# Patient Record
Sex: MALE | Race: AMERICAN INDIAN OR ALASKA NATIVE | NOT HISPANIC OR LATINO | Employment: OTHER | URBAN - METROPOLITAN AREA
[De-identification: names, ages, dates, MRNs, and addresses within clinical notes are randomized per-mention and may not be internally consistent; named-entity substitution may affect disease eponyms.]

---

## 2017-03-14 PROBLEM — G25.0 ESSENTIAL TREMOR: Status: ACTIVE | Noted: 2017-03-14

## 2017-09-14 PROBLEM — G89.29 CHRONIC PAIN OF RIGHT KNEE: Status: ACTIVE | Noted: 2017-09-14

## 2017-09-14 PROBLEM — M25.561 CHRONIC PAIN OF RIGHT KNEE: Status: ACTIVE | Noted: 2017-09-14

## 2017-09-27 PROBLEM — R05.9 COUGH: Status: ACTIVE | Noted: 2017-09-27

## 2017-10-02 PROBLEM — L03.115 CELLULITIS OF RIGHT KNEE: Status: ACTIVE | Noted: 2017-10-02

## 2017-10-04 PROBLEM — J96.20 ACUTE ON CHRONIC RESPIRATORY FAILURE: Status: ACTIVE | Noted: 2017-10-04

## 2017-12-18 ENCOUNTER — HOSPITAL ENCOUNTER (OUTPATIENT)
Dept: RADIOLOGY | Facility: OTHER | Age: 67
Discharge: HOME | End: 2017-12-18
Attending: ANESTHESIOLOGY
Payer: MEDICARE

## 2017-12-18 ENCOUNTER — TELEPHONE (OUTPATIENT)
Dept: PAIN MEDICINE | Facility: CLINIC | Age: 67
End: 2017-12-18

## 2017-12-18 ENCOUNTER — OFFICE VISIT (OUTPATIENT)
Dept: PAIN MEDICINE | Facility: CLINIC | Age: 67
End: 2017-12-18
Attending: ANESTHESIOLOGY
Payer: MEDICARE

## 2017-12-18 VITALS
RESPIRATION RATE: 18 BRPM | SYSTOLIC BLOOD PRESSURE: 135 MMHG | OXYGEN SATURATION: 99 % | HEART RATE: 82 BPM | HEIGHT: 72 IN | TEMPERATURE: 98 F | DIASTOLIC BLOOD PRESSURE: 76 MMHG

## 2017-12-18 DIAGNOSIS — M79.2 NEUROPATHIC PAIN, LEG, RIGHT: ICD-10-CM

## 2017-12-18 DIAGNOSIS — G89.29 CHRONIC PAIN OF RIGHT KNEE: Primary | ICD-10-CM

## 2017-12-18 DIAGNOSIS — G89.4 CHRONIC PAIN SYNDROME: Primary | ICD-10-CM

## 2017-12-18 DIAGNOSIS — M54.12 CERVICAL RADICULOPATHY: ICD-10-CM

## 2017-12-18 DIAGNOSIS — G89.4 CHRONIC PAIN SYNDROME: ICD-10-CM

## 2017-12-18 DIAGNOSIS — M79.2 NEUROPATHIC PAIN OF ANKLE, RIGHT: ICD-10-CM

## 2017-12-18 DIAGNOSIS — M25.561 CHRONIC PAIN OF RIGHT KNEE: ICD-10-CM

## 2017-12-18 DIAGNOSIS — M25.561 CHRONIC PAIN OF RIGHT KNEE: Primary | ICD-10-CM

## 2017-12-18 DIAGNOSIS — G89.29 CHRONIC PAIN OF RIGHT KNEE: ICD-10-CM

## 2017-12-18 PROCEDURE — 72114 X-RAY EXAM L-S SPINE BENDING: CPT | Mod: TC

## 2017-12-18 PROCEDURE — 99204 OFFICE O/P NEW MOD 45 MIN: CPT | Mod: S$GLB,,, | Performed by: ANESTHESIOLOGY

## 2017-12-18 PROCEDURE — 72070 X-RAY EXAM THORAC SPINE 2VWS: CPT | Mod: TC

## 2017-12-18 PROCEDURE — 72070 X-RAY EXAM THORAC SPINE 2VWS: CPT | Mod: 26,,, | Performed by: RADIOLOGY

## 2017-12-18 PROCEDURE — 72040 X-RAY EXAM NECK SPINE 2-3 VW: CPT | Mod: 26,,, | Performed by: RADIOLOGY

## 2017-12-18 PROCEDURE — 99999 PR PBB SHADOW E&M-EST. PATIENT-LVL III: CPT | Mod: PBBFAC,,, | Performed by: ANESTHESIOLOGY

## 2017-12-18 PROCEDURE — 72114 X-RAY EXAM L-S SPINE BENDING: CPT | Mod: 26,,, | Performed by: RADIOLOGY

## 2017-12-18 PROCEDURE — 72040 X-RAY EXAM NECK SPINE 2-3 VW: CPT | Mod: TC

## 2017-12-18 RX ORDER — CLONIDINE HYDROCHLORIDE 0.3 MG/1
TABLET ORAL
Status: ON HOLD | COMMUNITY
Start: 2017-12-14 | End: 2017-12-31 | Stop reason: HOSPADM

## 2017-12-18 RX ORDER — FUROSEMIDE 40 MG/1
TABLET ORAL
COMMUNITY
Start: 2017-12-14 | End: 2018-01-12 | Stop reason: SDUPTHER

## 2017-12-18 NOTE — PROGRESS NOTES
Chronic Pain - New Consult    Referring Physician: Earl Marrero MD    Chief Complaint: No chief complaint on file.       SUBJECTIVE: Disclaimer: This note has been generated using voice-recognition software. There may be typographical errors that have been missed during proof-reading    Initial encounter:    Constance Dhillon presents to the clinic for the evaluation of Bilateral Shoulder, Bilateral Knee, Bilateral feet and Lower back pain. The pain started over 18 years ago following an work accident and symptoms have been unchanged. States the pain has progressively gotten over time. States he has had pain for many years and has had multiple medications, injections, PT, topical creams, and acupuncture with minimal relief. He is followed by Dr. Earl Marrero who referred him here today to be evaluated for permanent spinal cord stimulator implantation. He most recently had a trial for the lower back and leg pain 3 weeks ago with [employer] and got 50+% reduction in his pain with much improved function. He also had a C-spine SCS trial on 9/18/17 and had 80+% reduction in his pain. He is here today hoping to move forward with the SCS permanent implants. He takes a baby aspirin daily. Denies bowel or bladder incontinence. His biggest pain today is his right foot. He had excellent coverage during the trial and was able to walk up the stairs for the first time in years.     Brief history:    Pain Description:    The pain is located in the Lower back area and radiates to the Bilateral Lower extremity.      At BEST  4/10     At WORST  10/10 on the WORST day.      On average pain is rated as 6/10.     Today the pain is rated as 7/10    The pain is described as aching, numbing, sharp, shooting and tingling      Symptoms interfere with daily activity and sleeping.     Exacerbating factors: Standing, Walking, Lifting and Getting out of bed/chair.      Mitigating factors medications and SCS trial.     Patient denies  night fever/night sweats, urinary incontinence, bowel incontinence, significant weight loss, significant motor weakness.  Patient denies any suicidal or homicidal ideations    Pain Medications:  Current:  Celebrex  Fentanyl 25 mcg       Tried in Past:  NSAIDs -Never  TCA -Never  SNRI -Never  Anti-convulsants -Never  Muscle Relaxants -all of them   Opioids-Never    Physical Therapy/Home Exercise: yes       report:      Pain Procedures: multiple injections in the past with minimal relief. The most he got with an injection was 3 weeks.     Chiropractor -yes  Acupuncture - yes  TENS unit -never  Spinal decompression -never  Joint replacement -bilateral TKA, right ankle arthrodesis    Imaging:   X-ray of Knee  Right knee.    Total knee replacement    Total right knee arthroplasty with drain in place    Impression total right knee arthroplasty      Electronically signed by: ERIBERTO JUAREZ MD  Date: 09/26/17  Time: 11:35     Past Medical History:   Diagnosis Date    Arthritis     post-traumatic arthritis R ankle; L knee arthritis s/p TKA, R knee arthritis    Benign essential tremor     Encounter for blood transfusion     Hypertension     Jaundice     CHILDHOOD    Renal calculi     Shingles     Situs inversus totalis     Sleep apnea     PAST H/O, STATES NO PROBLEMS NOW    SOB (shortness of breath)     Status post VNS (vagus nerve stimulator) placement     Wears glasses     White coat syndrome      Past Surgical History:   Procedure Laterality Date    ANKLE FRACTURE SURGERY Right     FUSION    CARPAL TUNNEL RELEASE      GASTROPLASTY  1996    HAND SURGERY Right 1977    Carpal tunnel    JOINT REPLACEMENT Left     TKR    WRIST SURGERY Right 1972    LACERATION REPAIR     Social History     Social History    Marital status:      Spouse name: N/A    Number of children: N/A    Years of education: N/A     Occupational History    Not on file.     Social History Main Topics    Smoking status: Former  Smoker     Types: Cigarettes, Cigars, Pipe     Quit date: 1968    Smokeless tobacco: Never Used    Alcohol use No    Drug use: No    Sexual activity: Yes     Partners: Female      Comment:      Other Topics Concern    Not on file     Social History Narrative    Lives with wife in [place]; lived in [place] for many years. Daughter in [place], recently .      Family History   Problem Relation Age of Onset    Other Mother      brain tumor    Diabetes Father      DM2    Heart disease Brother      CAD, pacemaker; heavy EtOH use    Alcohol abuse Brother        Review of patient's allergies indicates:  No Known Allergies    Current Outpatient Prescriptions   Medication Sig    amLODIPine (NORVASC) 10 MG tablet Take 1 tablet (10 mg total) by mouth once daily.    celecoxib (CELEBREX) 400 MG capsule Take 400 mg by mouth every evening.    fentaNYL (DURAGESIC) 25 mcg/hr Place 1 patch onto the skin every 72 hours.    fish oil-omega-3 fatty acids 300-1,000 mg capsule Take 2 g by mouth every morning.     hydroCHLOROthiazide (HYDRODIURIL) 25 MG tablet Take 1 tablet (25 mg total) by mouth once daily.    losartan (COZAAR) 50 MG tablet Take 2 tablets (100 mg total) by mouth once daily. (Patient taking differently: Take 50 mg by mouth every morning. )    minoxidil (LONITEN) 2.5 MG tablet Take 1 tablet (2.5 mg total) by mouth once daily.    MULTIVITAMIN (MULTIPLE VITAMINS DAILY ORAL) Take 1 tablet by mouth every morning.    potassium gluconate 595 mg (99 mg) Tab Take 1 tablet by mouth every morning.     No current facility-administered medications for this visit.        REVIEW OF SYSTEMS:    GENERAL:  No weight loss, malaise or fevers.  HEENT:   No recent changes in vision or hearing  NECK:  Negative for lumps, no difficulty with swallowing.  RESPIRATORY:  Negative for cough, wheezing or shortness of breath, patient denies any recent URI.  CARDIOVASCULAR:  Negative for chest pain, leg swelling or  palpitations.  GI:  Negative for abdominal discomfort, blood in stools or black stools or change in bowel habits.  MUSCULOSKELETAL:  See HPI.  SKIN:  Negative for lesions, rash, and itching.  PSYCH:  No mood disorder or recent psychosocial stressors.  Patients sleep is not disturbed secondary to pain.  HEMATOLOGY/LYMPHOLOGY:  Negative for prolonged bleeding, bruising easily or swollen nodes.  Patient is not currently taking any anti-coagulants  ENDO: No history of diabetes or thyroid dysfunction  NEURO:   No history of headaches, syncope, paralysis, seizures or tremors.  All other reviewed and negative other than HPI.    OBJECTIVE:    /76   Pulse 82   Temp 98 °F (36.7 °C) (Oral)   Resp 18   Ht 6' (1.829 m)   SpO2 99%     PHYSICAL EXAMINATION:    GENERAL: Well appearing, in no acute distress, alert and oriented x3.  PSYCH:  Mood and affect appropriate.  SKIN: Skin color, texture, turgor normal, no rashes or lesions.  HEAD/FACE:  Normocephalic, atraumatic. Cranial nerves grossly intact.  NECK: + palpation over the bilateral cervical paraspinous muscles. Spurling Negative. Mild pain with neck flexion>extension.  CV: RRR with palpation of the radial artery.  PULM: No evidence of respiratory difficulty, symmetric chest rise.  GI:  Soft and non-tender.  BACK: Straight leg raising in the supine position is negative to radicular pain. Mild pain upon palpation over the facet joints of the lumbar spine. No pain over spinous processes. Normal range of motion with pain with lumbar flexion and extension  EXTREMITIES: Peripheral joint ROM is full and pain free without obvious instability or laxity in all four extremities. No deformities, edema, or skin discoloration. Good capillary refill.  MUSCULOSKELETAL: Left shoulder exam with .  There is mild pain with palpation over the sacroiliac joints bilaterally.  FABERs test is negative.  FADIRs test is negative.   Bilateral upper extremity strength is normal and symmetric.  RLE muscle strength limited in the right ankle due to fusion.   No atrophy or tone abnormalities are noted.  NEURO: Absent LE reflexes. Reflexes 1+ and symmetric in the bilateral UE/   Plantar response are downgoing. No clonus.  No loss of sensation is noted.  GAIT: normal.    ASSESSMENT: 67 y.o. year old male with pain, consistent with     Encounter Diagnoses   Name Primary?    Chronic pain of right knee Yes    Chronic pain disorder     Cervical radiculopathy     Neuropathic pain, leg, right        PLAN:     -Will order new xrays of the C,T, and L spine today. We will obtain F/E views of the C and L-spine.   - Will schedule patient for permanent spinal cord implantation with [employer] with two leads in the C-spine and 2 leads in the T-spine. He previously had great benefit with the SCS trials performed by Dr. Earl Marrero. Consent obtained today.   -Continue medication management per Dr. Marrero  -[facility] for post-operative care following the SCS implants    Aydin Bal DO  [facility] Pain Medicine Fellow      The above plan and management options were discussed at length with patient. Patient is in agreement with the above and verbalized understanding. It will be communicated with the referring physician via electronic record, fax, or mail.    I reviewed and edited the  fellow's note, I conducted my own interview and physical examination and agree with the findings.        Olu Garcias  12/18/2017

## 2017-12-18 NOTE — PROGRESS NOTES
Chronic Pain - New Consult    Referring Physician: Earl Marrero MD    Chief Complaint: No chief complaint on file.       SUBJECTIVE: Disclaimer: This note has been generated using voice-recognition software. There may be typographical errors that have been missed during proof-reading    Initial encounter:    Constance Dhillon presents to the clinic for the evaluation of *** pain. The pain started *** ago following *** and symptoms have been {IUW:30403}.    Brief history:    Pain Description:    The pain is located in the *** area and radiates to the ***.      At BEST  {GEN PAIN SCALE HI:46255}     At WORST  {GEN PAIN SCALE HI:67432} on the WORST day.      On average pain is rated as {GEN PAIN SCALE HI:68756}.     Today the pain is rated as {GEN PAIN SCALE HI:78434}    The pain is described as {Desc; pain character:31038}      Symptoms interfere with {INTERFERE:84075}.     Exacerbating factors: {Causes; Pain:01314}.      Mitigating factors {MITIGATIN}.     Patient {Denies / Reports:96503} {RED FLAGS:}.  Patient denies any suicidal or homicidal ideations    Pain Medications:  Current:  ***    Tried in Past:  NSAIDs -Never  TCA -Never  SNRI -Never  Anti-convulsants -Never  Muscle Relaxants -Never  Opioids-Never    Physical Therapy/Home Exercise: {YES/NO:63}       report:  {:61268}    Pain Procedures: ***    Chiropractor -never  Acupuncture - never  TENS unit -never  Spinal decompression -never  Joint replacement -never    Imaging: none available for review today    Past Medical History:   Diagnosis Date    Arthritis     post-traumatic arthritis R ankle; L knee arthritis s/p TKA, R knee arthritis    Benign essential tremor     Encounter for blood transfusion     Hypertension     Jaundice     CHILDHOOD    Renal calculi     Shingles     Situs inversus totalis     Sleep apnea     PAST H/O, STATES NO PROBLEMS NOW    SOB (shortness of breath)     Status post VNS (vagus nerve stimulator)  placement     Wears glasses     White coat syndrome      Past Surgical History:   Procedure Laterality Date    ANKLE FRACTURE SURGERY Right     FUSION    CARPAL TUNNEL RELEASE      GASTROPLASTY  1996    HAND SURGERY Right 1977    Carpal tunnel    JOINT REPLACEMENT Left     TKR    WRIST SURGERY Right 1972    LACERATION REPAIR     Social History     Social History    Marital status:      Spouse name: N/A    Number of children: N/A    Years of education: N/A     Occupational History    Not on file.     Social History Main Topics    Smoking status: Former Smoker     Types: Cigarettes, Cigars, Pipe     Quit date: 1968    Smokeless tobacco: Never Used    Alcohol use No    Drug use: No    Sexual activity: Yes     Partners: Female      Comment:      Other Topics Concern    Not on file     Social History Narrative    Lives with wife in [place]; lived in [place] for many years. Daughter in [place], recently .      Family History   Problem Relation Age of Onset    Other Mother      brain tumor    Diabetes Father      DM2    Heart disease Brother      CAD, pacemaker; heavy EtOH use    Alcohol abuse Brother        Review of patient's allergies indicates:  No Known Allergies    Current Outpatient Prescriptions   Medication Sig    amLODIPine (NORVASC) 10 MG tablet Take 1 tablet (10 mg total) by mouth once daily.    celecoxib (CELEBREX) 400 MG capsule Take 400 mg by mouth every evening.    cloNIDine (CATAPRES) 0.3 MG tablet     fentaNYL (DURAGESIC) 25 mcg/hr Place 1 patch onto the skin every 72 hours.    fish oil-omega-3 fatty acids 300-1,000 mg capsule Take 2 g by mouth every morning.     furosemide (LASIX) 40 MG tablet     hydroCHLOROthiazide (HYDRODIURIL) 25 MG tablet Take 1 tablet (25 mg total) by mouth once daily.    losartan (COZAAR) 50 MG tablet Take 2 tablets (100 mg total) by mouth once daily. (Patient taking differently: Take 50 mg by mouth every morning. )    minoxidil  (LONITEN) 2.5 MG tablet Take 1 tablet (2.5 mg total) by mouth once daily.    MULTIVITAMIN (MULTIPLE VITAMINS DAILY ORAL) Take 1 tablet by mouth every morning.    potassium gluconate 595 mg (99 mg) Tab Take 1 tablet by mouth every morning.     No current facility-administered medications for this visit.        REVIEW OF SYSTEMS:    GENERAL:  No weight loss, malaise or fevers.  HEENT:   No recent changes in vision or hearing  NECK:  Negative for lumps, no difficulty with swallowing.  RESPIRATORY:  Negative for cough, wheezing or shortness of breath, patient denies any recent URI.  CARDIOVASCULAR:  Negative for chest pain, leg swelling or palpitations.  GI:  Negative for abdominal discomfort, blood in stools or black stools or change in bowel habits.  MUSCULOSKELETAL:  See HPI.  SKIN:  Negative for lesions, rash, and itching.  PSYCH:  No mood disorder or recent psychosocial stressors.  Patients sleep is not disturbed secondary to pain.  HEMATOLOGY/LYMPHOLOGY:  Negative for prolonged bleeding, bruising easily or swollen nodes.  Patient is not currently taking any anti-coagulants  ENDO: No history of diabetes or thyroid dysfunction  NEURO:   No history of headaches, syncope, paralysis, seizures or tremors.  All other reviewed and negative other than HPI.    OBJECTIVE:    There were no vitals taken for this visit.    PHYSICAL EXAMINATION:    GENERAL: Well appearing, in no acute distress, alert and oriented x3.  PSYCH:  Mood and affect appropriate.  SKIN: Skin color, texture, turgor normal, no rashes or lesions.  HEAD/FACE:  Normocephalic, atraumatic. Cranial nerves grossly intact.  NECK: No pain to palpation over the cervical paraspinous muscles. Spurling Negative. No pain with neck flexion, extension, or lateral flexion.   CV: RRR with palpation of the radial artery.  PULM: No evidence of respiratory difficulty, symmetric chest rise.  GI:  Soft and non-tender.  BACK: Straight leg raising in the supine position is  negative to radicular pain. No pain to palpation over the facet joints of the lumbar spine or spinous processes. Normal range of motion without pain reproduction.  EXTREMITIES: Peripheral joint ROM is full and pain free without obvious instability or laxity in all four extremities. No deformities, edema, or skin discoloration. Good capillary refill.  MUSCULOSKELETAL: Shoulder, hip, and knee provocative maneuvers are negative.  There is no pain with palpation over the sacroiliac joints bilaterally.  FABERs test is negative.  FADIRs test is negative.   Bilateral upper and lower extremity strength is normal and symmetric.  No atrophy or tone abnormalities are noted.  NEURO: Bilateral upper and lower extremity coordination and muscle stretch reflexes are physiologic and symmetric.  Plantar response are downgoing. No clonus.  No loss of sensation is noted.  GAIT: normal.    ASSESSMENT: 67 y.o. year old male with pain, consistent with     No diagnosis found.    PLAN:       The above plan and management options were discussed at length with patient. Patient is in agreement with the above and verbalized understanding. It will be communicated with the referring physician via electronic record, fax, or mail.    Olu Garcias  12/18/2017

## 2017-12-18 NOTE — LETTER
December 18, 2017      Earl Marrero MD  [address]  [place] [address]           [facility]  [address]  [place] [address]  Phone: [phone]  Fax: [phone]          Patient: Constance Dhillon   MR Number: [id number]   YOB: 1950   Date of Visit: 12/18/2017       Dear Dr. Earl Marrero:    Thank you for referring Constance Dhillon to me for evaluation. Attached you will find relevant portions of my assessment and plan of care.    If you have questions, please do not hesitate to call me. I look forward to following Constance Dhillon along with you.    Sincerely,    Olu Garcias MD    Enclosure  CC:  No Recipients    If you would like to receive this communication electronically, please contact [email or web address] or [phone] to request more information on [employer] access.    For providers and/or their staff who would like to refer a patient to [facility], please contact us through our one-stop-shop provider referral line, [facility], at [phone].    If you feel you have received this communication in error or would no longer like to receive these types of communications, please e-mail [email or web address]

## 2017-12-30 PROBLEM — R07.9 CHEST PAIN: Status: ACTIVE | Noted: 2017-12-30

## 2018-01-10 ENCOUNTER — HOSPITAL ENCOUNTER (OUTPATIENT)
Dept: PREADMISSION TESTING | Facility: OTHER | Age: 68
Discharge: HOME | End: 2018-01-10
Attending: ANESTHESIOLOGY
Payer: MEDICARE

## 2018-01-10 ENCOUNTER — ANESTHESIA EVENT (OUTPATIENT)
Dept: SURGERY | Facility: OTHER | Age: 68
End: 2018-01-10
Payer: MEDICARE

## 2018-01-10 VITALS
DIASTOLIC BLOOD PRESSURE: 83 MMHG | WEIGHT: 280 LBS | TEMPERATURE: 98 F | OXYGEN SATURATION: 94 % | HEART RATE: 74 BPM | HEIGHT: 72 IN | BODY MASS INDEX: 37.93 KG/M2 | SYSTOLIC BLOOD PRESSURE: 145 MMHG

## 2018-01-10 RX ORDER — CHOLECALCIFEROL (VITAMIN D3) 25 MCG
1000 TABLET ORAL DAILY
COMMUNITY
End: 2018-01-12 | Stop reason: SDUPTHER

## 2018-01-10 RX ORDER — SODIUM CHLORIDE, SODIUM LACTATE, POTASSIUM CHLORIDE, CALCIUM CHLORIDE 600; 310; 30; 20 MG/100ML; MG/100ML; MG/100ML; MG/100ML
INJECTION, SOLUTION INTRAVENOUS CONTINUOUS
Status: CANCELLED | OUTPATIENT
Start: 2018-01-10

## 2018-01-10 RX ORDER — PNV NO.95/FERROUS FUM/FOLIC AC 28MG-0.8MG
1000 TABLET ORAL DAILY
COMMUNITY
End: 2018-01-12 | Stop reason: SDUPTHER

## 2018-01-10 NOTE — ANESTHESIA PREPROCEDURE EVALUATION
01/10/2018  Constance Dhillon is a 67 y.o., male.    Anesthesia Evaluation    I have reviewed the Patient Summary Reports.    I have reviewed the Nursing Notes.   I have reviewed the Medications.     Review of Systems  Anesthesia Hx:  No problems with previous Anesthesia  Denies Family Hx of Anesthesia complications.   Denies Personal Hx of Anesthesia complications.   Social:  Non-Smoker    Hematology/Oncology:  Hematology Normal   Oncology Normal     EENT/Dental:EENT/Dental Normal   Cardiovascular:   Exercise tolerance: good Hypertension        Pulmonary:   Shortness of breath Sleep Apnea Chronically SOB but gets around fine   Renal/:   Chronic Renal Disease    Musculoskeletal:  Spine Disorders: cervical and lumbar Chronic Pain    Neurological:   Neuromuscular Disease,   Chronic Pain Syndrome   Endocrine:  Endocrine Normal    Dermatological:  Skin Normal    Psych:  Psychiatric Normal           Physical Exam  General:  Morbid Obesity    Airway/Jaw/Neck:  Airway Findings: Mouth Opening: Normal Tongue: Normal  General Airway Assessment: Adult  Mallampati: I  TM Distance: Normal, at least 6 cm  Jaw/Neck Findings:  Neck ROM: Normal ROM      Dental:  Dental Findings: In tact              Anesthesia Plan  Type of Anesthesia, risks & benefits discussed:  Anesthesia Type:  general, MAC  Patient's Preference:   Intra-op Monitoring Plan: standard ASA monitors  Intra-op Monitoring Plan Comments:   Post Op Pain Control Plan:   Post Op Pain Control Plan Comments:   Induction:   IV  Beta Blocker:         Informed Consent: Patient understands risks and agrees with Anesthesia plan.  Questions answered. Anesthesia consent signed with patient.  ASA Score: 3     Day of Surgery Review of History & Physical:    H&P update referred to the surgeon.     Anesthesia Plan Notes: Patient has entire body situs inversus.  Everything in body  is on the opposite side. Found out at age 45.  Sats read low (80's) for about a week after gen anesth        Ready For Surgery From Anesthesia Perspective.

## 2018-01-10 NOTE — DISCHARGE INSTRUCTIONS
PRE-ADMIT TESTING -  [phone]    [address]  [place] [facility]          Your surgery has been scheduled at [facility]. We are pleased to have the opportunity to serve you. For Further Information please call [phone].    On the day of surgery please report to the Information Desk on the 1st floor.    · CONTACT YOUR PHYSICIAN'S OFFICE THE DAY PRIOR TO YOUR SURGERY TO OBTAIN YOUR ARRIVAL TIME.     · The evening before surgery do not eat anything after 9 p.m. ( this includes hard candy, chewing gum and mints).  You may only have GATORADE, POWERADE AND WATER  from 9 p.m. until you leave your home.   DO NOT DRINK ANY LIQUIDS ON THE WAY TO THE HOSPITAL.      SPECIAL MEDICATION INSTRUCTIONS: TAKE medications checked off by the Anesthesiologist on your Medication List.    Angiogram Patients: Take medications as instructed by your physician, including aspirin.     Surgery Patients:    If you take ASPIRIN - Your PHYSICIAN/SURGEON will need to inform you IF/OR when you need to stop taking aspirin prior to your surgery.     Do Not take any medications containing IBUPROFEN.  Do Not Wear any make-up or dark nail polish   (especially eye make-up) to surgery. If you come to surgery with makeup on you will be required to remove the makeup or nail polish.    Do not shave your surgical area at least 5 days prior to your surgery. The surgical prep will be performed at the hospital according to Infection Control regulations.    Leave all valuables at home.   Do Not wear any jewelry or watches, including any metal in body piercings.  Contact Lens must be removed before surgery. Either do not wear the contact lens or bring a case and solution for storage.  Please bring a container for eyeglasses or dentures as required.  Bring any paperwork your physician has provided, such as consent forms,  history and physicals, doctor's orders, etc.   Bring comfortable clothes that are loose fitting to wear upon  discharge. Take into consideration the type of surgery being performed.  Maintain your diet as advised per your physician the day prior to surgery.      Adequate rest the night before surgery is advised.   Park in the Parking lot behind the hospital or in the [facility] across the street from the parking lot. Parking is complimentary.  If you will be discharged the same day as your procedure, please arrange for a responsible adult to drive you home or to accompany you if traveling by taxi.   YOU WILL NOT BE PERMITTED TO DRIVE OR TO LEAVE THE HOSPITAL ALONE AFTER SURGERY.   It is strongly recommended that you arrange for someone to remain with you for the first 24 hrs following your surgery.       Thank you for your cooperation.  The Staff of [facility].        Bathing Instructions                                                                 Please shower the evening before and morning of your procedure with    ANTIBACTERIAL SOAP. ( DIAL, etc )  Concentrate on the surgical area   for at least 3 minutes and rinse completely. Dry off as usual.   Do not use any deodorant, powder, body lotions, perfume, after shave or    cologne.

## 2018-01-12 ENCOUNTER — TELEPHONE (OUTPATIENT)
Dept: PAIN MEDICINE | Facility: CLINIC | Age: 68
End: 2018-01-12

## 2018-01-12 PROBLEM — I50.30 HEART FAILURE WITH PRESERVED EJECTION FRACTION, NYHA CLASS II: Status: ACTIVE | Noted: 2018-01-12

## 2018-01-12 PROBLEM — I42.9 CARDIOMYOPATHY: Status: ACTIVE | Noted: 2018-01-12

## 2018-01-12 PROBLEM — E78.5 HYPERLIPIDEMIA: Status: ACTIVE | Noted: 2018-01-12

## 2018-01-12 PROBLEM — Z13.6 SCREENING FOR AAA (ABDOMINAL AORTIC ANEURYSM): Status: ACTIVE | Noted: 2018-01-12

## 2018-01-12 PROBLEM — Q24.0 DEXTROCARDIA: Status: ACTIVE | Noted: 2018-01-12

## 2018-01-12 NOTE — TELEPHONE ENCOUNTER
Left voice messae verifying patients procedure is scheduled for 1-15-18 at 3:00pm with his arrival time of 1:00pm.

## 2018-01-15 ENCOUNTER — TELEPHONE (OUTPATIENT)
Dept: PAIN MEDICINE | Facility: CLINIC | Age: 68
End: 2018-01-15

## 2018-01-15 ENCOUNTER — HOSPITAL ENCOUNTER (OUTPATIENT)
Facility: OTHER | Age: 68
Discharge: HOME | End: 2018-01-15
Attending: ANESTHESIOLOGY | Admitting: ANESTHESIOLOGY
Payer: MEDICARE

## 2018-01-15 ENCOUNTER — ANESTHESIA (OUTPATIENT)
Dept: SURGERY | Facility: OTHER | Age: 68
End: 2018-01-15
Payer: MEDICARE

## 2018-01-15 VITALS
OXYGEN SATURATION: 96 % | RESPIRATION RATE: 16 BRPM | HEART RATE: 70 BPM | BODY MASS INDEX: 37.93 KG/M2 | DIASTOLIC BLOOD PRESSURE: 81 MMHG | SYSTOLIC BLOOD PRESSURE: 144 MMHG | TEMPERATURE: 98 F | HEIGHT: 72 IN | WEIGHT: 280 LBS

## 2018-01-15 DIAGNOSIS — G89.4 CHRONIC PAIN SYNDROME: Primary | ICD-10-CM

## 2018-01-15 DIAGNOSIS — M54.12 CERVICAL RADICULOPATHY: ICD-10-CM

## 2018-01-15 PROCEDURE — 25000003 PHARM REV CODE 250: Performed by: ANESTHESIOLOGY

## 2018-01-15 PROCEDURE — 71000015 HC POSTOP RECOV 1ST HR: Performed by: ANESTHESIOLOGY

## 2018-01-15 PROCEDURE — C1787 PATIENT PROGR, NEUROSTIM: HCPCS | Performed by: ANESTHESIOLOGY

## 2018-01-15 PROCEDURE — 27800903 OPTIME MED/SURG SUP & DEVICES OTHER IMPLANTS: Performed by: ANESTHESIOLOGY

## 2018-01-15 PROCEDURE — C1713 ANCHOR/SCREW BN/BN,TIS/BN: HCPCS | Performed by: ANESTHESIOLOGY

## 2018-01-15 PROCEDURE — C1778 LEAD, NEUROSTIMULATOR: HCPCS | Performed by: ANESTHESIOLOGY

## 2018-01-15 PROCEDURE — 37000009 HC ANESTHESIA EA ADD 15 MINS: Performed by: ANESTHESIOLOGY

## 2018-01-15 PROCEDURE — 63650 IMPLANT NEUROELECTRODES: CPT | Mod: ,,, | Performed by: ANESTHESIOLOGY

## 2018-01-15 PROCEDURE — S0020 INJECTION, BUPIVICAINE HYDRO: HCPCS | Performed by: ANESTHESIOLOGY

## 2018-01-15 PROCEDURE — 63685 INS/RPLC SPI NPG/RCVR POCKET: CPT | Mod: ,,, | Performed by: ANESTHESIOLOGY

## 2018-01-15 PROCEDURE — 27201423 OPTIME MED/SURG SUP & DEVICES STERILE SUPPLY: Performed by: ANESTHESIOLOGY

## 2018-01-15 PROCEDURE — 25000003 PHARM REV CODE 250: Performed by: NURSE ANESTHETIST, CERTIFIED REGISTERED

## 2018-01-15 PROCEDURE — 63600175 PHARM REV CODE 636 W HCPCS: Performed by: NURSE ANESTHETIST, CERTIFIED REGISTERED

## 2018-01-15 PROCEDURE — 95971 ALYS SMPL SP/PN NPGT W/PRGRM: CPT | Mod: ,,, | Performed by: ANESTHESIOLOGY

## 2018-01-15 PROCEDURE — 36000707: Performed by: ANESTHESIOLOGY

## 2018-01-15 PROCEDURE — 37000008 HC ANESTHESIA 1ST 15 MINUTES: Performed by: ANESTHESIOLOGY

## 2018-01-15 PROCEDURE — 63600175 PHARM REV CODE 636 W HCPCS: Performed by: ANESTHESIOLOGY

## 2018-01-15 PROCEDURE — C1820 GENERATOR NEURO RECHG BAT SY: HCPCS | Performed by: ANESTHESIOLOGY

## 2018-01-15 PROCEDURE — 36000706: Performed by: ANESTHESIOLOGY

## 2018-01-15 DEVICE — ANCHOR LEAD NEROSTIMLTR CLIK X: Type: IMPLANTABLE DEVICE | Site: THORACIC | Status: FUNCTIONAL

## 2018-01-15 DEVICE — IMPLANTABLE DEVICE: Type: IMPLANTABLE DEVICE | Site: NECK | Status: FUNCTIONAL

## 2018-01-15 RX ORDER — OXYCODONE AND ACETAMINOPHEN 5; 325 MG/1; MG/1
1-2 TABLET ORAL EVERY 4 HOURS PRN
Qty: 60 TABLET | Refills: 0 | Status: SHIPPED | OUTPATIENT
Start: 2018-01-15 | End: 2019-06-11

## 2018-01-15 RX ORDER — BACITRACIN 50000 [IU]/1
INJECTION, POWDER, FOR SOLUTION INTRAMUSCULAR
Status: DISCONTINUED | OUTPATIENT
Start: 2018-01-15 | End: 2018-01-15 | Stop reason: HOSPADM

## 2018-01-15 RX ORDER — ONDANSETRON 2 MG/ML
4 INJECTION INTRAMUSCULAR; INTRAVENOUS DAILY PRN
Status: DISCONTINUED | OUTPATIENT
Start: 2018-01-15 | End: 2018-01-15 | Stop reason: HOSPADM

## 2018-01-15 RX ORDER — CLONIDINE HYDROCHLORIDE 0.3 MG/1
0.3 TABLET ORAL 2 TIMES DAILY
COMMUNITY
End: 2019-05-15

## 2018-01-15 RX ORDER — MEPERIDINE HYDROCHLORIDE 50 MG/ML
12.5 INJECTION INTRAMUSCULAR; INTRAVENOUS; SUBCUTANEOUS ONCE AS NEEDED
Status: DISCONTINUED | OUTPATIENT
Start: 2018-01-15 | End: 2018-01-15 | Stop reason: HOSPADM

## 2018-01-15 RX ORDER — FENTANYL CITRATE 50 UG/ML
INJECTION, SOLUTION INTRAMUSCULAR; INTRAVENOUS
Status: DISCONTINUED | OUTPATIENT
Start: 2018-01-15 | End: 2018-01-15

## 2018-01-15 RX ORDER — OXYCODONE HYDROCHLORIDE 5 MG/1
5 TABLET ORAL
Status: DISCONTINUED | OUTPATIENT
Start: 2018-01-15 | End: 2018-01-15 | Stop reason: HOSPADM

## 2018-01-15 RX ORDER — MIDAZOLAM HYDROCHLORIDE 1 MG/ML
INJECTION INTRAMUSCULAR; INTRAVENOUS
Status: DISCONTINUED | OUTPATIENT
Start: 2018-01-15 | End: 2018-01-15

## 2018-01-15 RX ORDER — FENTANYL CITRATE 50 UG/ML
25 INJECTION, SOLUTION INTRAMUSCULAR; INTRAVENOUS EVERY 5 MIN PRN
Status: DISCONTINUED | OUTPATIENT
Start: 2018-01-15 | End: 2018-01-15 | Stop reason: HOSPADM

## 2018-01-15 RX ORDER — DOXYCYCLINE 100 MG/1
100 CAPSULE ORAL 2 TIMES DAILY
Qty: 14 CAPSULE | Refills: 0 | Status: SHIPPED | OUTPATIENT
Start: 2018-01-15 | End: 2019-06-24

## 2018-01-15 RX ORDER — HYDROMORPHONE HYDROCHLORIDE 2 MG/ML
0.4 INJECTION, SOLUTION INTRAMUSCULAR; INTRAVENOUS; SUBCUTANEOUS EVERY 5 MIN PRN
Status: DISCONTINUED | OUTPATIENT
Start: 2018-01-15 | End: 2018-01-15 | Stop reason: HOSPADM

## 2018-01-15 RX ORDER — SODIUM CHLORIDE, SODIUM LACTATE, POTASSIUM CHLORIDE, CALCIUM CHLORIDE 600; 310; 30; 20 MG/100ML; MG/100ML; MG/100ML; MG/100ML
INJECTION, SOLUTION INTRAVENOUS CONTINUOUS
Status: DISCONTINUED | OUTPATIENT
Start: 2018-01-15 | End: 2018-01-15 | Stop reason: HOSPADM

## 2018-01-15 RX ORDER — ACETAMINOPHEN 10 MG/ML
INJECTION, SOLUTION INTRAVENOUS
Status: DISCONTINUED | OUTPATIENT
Start: 2018-01-15 | End: 2018-01-15

## 2018-01-15 RX ORDER — BACITRACIN ZINC 500 UNIT/G
OINTMENT (GRAM) TOPICAL
Status: DISCONTINUED | OUTPATIENT
Start: 2018-01-15 | End: 2018-01-15 | Stop reason: HOSPADM

## 2018-01-15 RX ORDER — BUPIVACAINE HYDROCHLORIDE 5 MG/ML
INJECTION, SOLUTION EPIDURAL; INTRACAUDAL
Status: DISCONTINUED | OUTPATIENT
Start: 2018-01-15 | End: 2018-01-15 | Stop reason: HOSPADM

## 2018-01-15 RX ORDER — LIDOCAINE HCL/EPINEPHRINE/PF 2%-1:200K
VIAL (ML) INJECTION
Status: DISCONTINUED | OUTPATIENT
Start: 2018-01-15 | End: 2018-01-15 | Stop reason: HOSPADM

## 2018-01-15 RX ORDER — SODIUM CHLORIDE 0.9 % (FLUSH) 0.9 %
3 SYRINGE (ML) INJECTION
Status: DISCONTINUED | OUTPATIENT
Start: 2018-01-15 | End: 2018-01-15 | Stop reason: HOSPADM

## 2018-01-15 RX ADMIN — ACETAMINOPHEN 1000 MG: 10 INJECTION, SOLUTION INTRAVENOUS at 03:01

## 2018-01-15 RX ADMIN — FENTANYL CITRATE 25 MCG: 50 INJECTION, SOLUTION INTRAMUSCULAR; INTRAVENOUS at 03:01

## 2018-01-15 RX ADMIN — VANCOMYCIN HYDROCHLORIDE 1000 MG: 1 INJECTION, POWDER, LYOPHILIZED, FOR SOLUTION INTRAVENOUS at 02:01

## 2018-01-15 RX ADMIN — DEXTROSE 2 G: 50 INJECTION, SOLUTION INTRAVENOUS at 02:01

## 2018-01-15 RX ADMIN — FENTANYL CITRATE 50 MCG: 50 INJECTION, SOLUTION INTRAMUSCULAR; INTRAVENOUS at 02:01

## 2018-01-15 RX ADMIN — FENTANYL CITRATE 50 MCG: 50 INJECTION, SOLUTION INTRAMUSCULAR; INTRAVENOUS at 04:01

## 2018-01-15 RX ADMIN — MIDAZOLAM HYDROCHLORIDE 0.5 MG: 1 INJECTION, SOLUTION INTRAMUSCULAR; INTRAVENOUS at 05:01

## 2018-01-15 RX ADMIN — SODIUM CHLORIDE, SODIUM LACTATE, POTASSIUM CHLORIDE, AND CALCIUM CHLORIDE: 600; 310; 30; 20 INJECTION, SOLUTION INTRAVENOUS at 02:01

## 2018-01-15 RX ADMIN — MIDAZOLAM HYDROCHLORIDE 1 MG: 1 INJECTION, SOLUTION INTRAMUSCULAR; INTRAVENOUS at 03:01

## 2018-01-15 RX ADMIN — FENTANYL CITRATE 25 MCG: 50 INJECTION, SOLUTION INTRAMUSCULAR; INTRAVENOUS at 05:01

## 2018-01-15 RX ADMIN — OXYCODONE HYDROCHLORIDE 5 MG: 5 TABLET ORAL at 06:01

## 2018-01-15 RX ADMIN — MIDAZOLAM HYDROCHLORIDE 2 MG: 1 INJECTION, SOLUTION INTRAMUSCULAR; INTRAVENOUS at 02:01

## 2018-01-15 RX ADMIN — FENTANYL CITRATE 25 MCG: 50 INJECTION, SOLUTION INTRAMUSCULAR; INTRAVENOUS at 04:01

## 2018-01-15 NOTE — TRANSFER OF CARE
Anesthesia Transfer of Care Note    Patient: Constance Dhillon    Procedure(s) Performed: Procedure(s) (LRB):  INSERTION-STIMULATOR-DORSAL COLUMN 2 cervical leads, 2 thoracic leads and one internal battery (N/A)    Patient location: [facility]    Anesthesia Type: MAC    Transport from OR: Transported from OR on room air with adequate spontaneous ventilation    Post pain: adequate analgesia    Post assessment: no apparent anesthetic complications and tolerated procedure well    Post vital signs: stable    Level of consciousness: awake, alert and oriented    Nausea/Vomiting: no nausea/vomiting    Complications: none    Transfer of care protocol was followed      Last vitals:   Visit Vitals  /82 (BP Location: Left arm, Patient Position: Sitting)   Pulse (!) 56   Temp 36.4 °C (97.5 °F) (Oral)   Resp 16   Ht 6' (1.829 m)   Wt 127 kg (280 lb)   SpO2 95%   BMI 37.97 kg/m²

## 2018-01-15 NOTE — OP NOTE
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 The area overlying the skin of the back was prepped and draped in usual sterile fashion using chlorhexidine with an Ioban dressing over the skin. A time out was performed and there was confirmation of preoperative antibiotics.     Fluoroscopy was used to identify the L1/2 intralaminar space this area was marked and an approximately 6 cm incision was planned and this area.  Additionally the T1/2 space was identified marked and anesthetized. The tract was anesthetized at the level of the skin and deeper tissues leading to the prevertebral fascia with 40 mL of a equal mixture of 0.5% bupivacaine with 1:200,000 epinephrine +1% lidocaine through a 25-gauge needle. This was followed with the skin incision with a 10 blade scalpel, followed by sharp and blunt dissection to the prevertebral fascia using cautery for hemostasis.  A modified Touhy needle was then advanced under fluoroscopy and walked off of the lamina at L1/2 on each side followed by loss of resistance to air to enter the epidural space. 2 needles were also walked  off of the lamina at T1/2.  Once the epidural space was entered the octad electrode was advanced under live fluoroscopy in the AP and lateral view to the C2 level in the cervical spine and the T7/8  level  in the posterior aspect of the thoracic epidural space, there was scar tissue limiting the movement of the T7/8 space. The electrodes were then connected with the  and intraoperative programming was performed to confirm that the electrodes were placed appropriately. After there was confirmation of the appropriate placement the modified Touhy needles were withdrawn and the stylette were removed from the electrodes. The electrodes were then anchored into place using an anchor provided by the stimulator company ([employer]) and the anchor was secured to the prevertebral fascia using fixate suture. A pulse generator pocket was created on the right side superior to the iliac crest. An approximately 6 cm incision was planned the area overlying the skin was anesthetized with an additional 10 mL of local anesthetic to a 25 gauge needle. This was followed by incising the skin with a 15 blade scalpel and sharp and blunt dissection to create a pocket approximately the size of the pulse generator for the spectra pulse generator approximately 1 inch deep to the skin. Electrocautery was used for hemostasis. The tunneler provided by the company was used to create a tunnel between the midline incision and a pulse generator pocket the electrodes were then threaded through with a stress relief loop being maintained in the midline incision. The electrodes were connected to the battery and the battery was tested so that the entire system was confirmed to be working appropriately. Both sites were irrigated with bacitracin solution and there was care to confirm hemostasis. Both incisions were then closed with interrupted 2-0 Vicryl followed by staples. This was followed by bacitracin ointment being placed over the  staples. Dressing was placed over the skin and an abdominal binder was placed around the patient.     The patient was taken to recovery in stable condition and the stimulator was programmed postoperatively to capture all the usual painful areas.     The patient was discharged from the hospital in stable condition.

## 2018-01-15 NOTE — TELEPHONE ENCOUNTER
Spoke with patient and asked if he could move up his procedure time today he is now scheduled for 1:30pm Will Biggs notified about the time change.

## 2018-01-15 NOTE — OR NURSING
The patient verbalized understanding of the surgical procedure as explained and documented on the consent form  by the physician and  has no further questions at this time. The patient's signature was witnessed by the RN.

## 2018-01-15 NOTE — OR NURSING
Dr. Bill notified that pt ate bergeron and eggs at 0630.  States case will be postponed until 1430.  Pt and spouse notified

## 2018-01-15 NOTE — DISCHARGE SUMMARY
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  Qty: 30 tablet, Refills: 11      aspirin 81 MG Chew Take 1 tablet (81 mg total) by mouth once daily.  Refills: 0      atorvastatin (LIPITOR) 80 MG tablet Take 1 tablet (80 mg total) by mouth every evening.  Qty: 90 tablet, Refills: 4      carvedilol (COREG) 25 MG tablet Take 1 tablet (25 mg total) by mouth 2 (two) times daily with meals. 1/2 twice a day for 2 weeks, then a whole pill twice a day-with food  Qty: 180 tablet, Refills: 4      cholecalciferol, vitamin D3, (VITAMIN D3) 5,000 unit Tab Take 5,000 Units by mouth once daily.      cloNIDine (CATAPRES) 0.3 MG tablet Take 0.3 mg by mouth 2 (two) times daily.      fish oil-omega-3 fatty acids 300-1,000 mg capsule Take 2 g by mouth every morning.       furosemide (LASIX) 40 MG tablet Take 1 tablet (40 mg total) by mouth once daily.  Qty: 90 tablet, Refills: 11      losartan (COZAAR) 100 MG tablet Take 1 tablet (100  mg total) by mouth every evening.  Qty: 90 tablet, Refills: 5    Associated Diagnoses: Essential hypertension      MULTIVITAMIN (MULTIPLE VITAMINS DAILY ORAL) Take 1 tablet by mouth every morning.      spironolactone (ALDACTONE) 50 MG tablet Take 1 tablet (50 mg total) by mouth once daily.  Qty: 90 tablet, Refills: 4      TURMERIC/TURMERIC EXT/PEPR EXT (TURMERIC-TURMERIC EXT-PEPPER) 500-3 mg Cap Take 1 capsule by mouth once daily.      vitamin E 400 UNIT capsule Take 400 Units by mouth once daily.      fentaNYL (DURAGESIC) 25 mcg/hr Place 1 patch onto the skin every 72 hours.      potassium gluconate 595 mg (99 mg) Tab Take 1 tablet by mouth every morning.             Resume home diet and activity

## 2018-01-15 NOTE — ANESTHESIA POSTPROCEDURE EVALUATION
Anesthesia Post Evaluation    Patient: Constance Dhillon    Procedure(s) Performed: Procedure(s) (LRB):  INSERTION-STIMULATOR-DORSAL COLUMN 2 cervical leads, 2 thoracic leads and one internal battery (N/A)    Final Anesthesia Type: MAC  Patient location during evaluation: [facility]  Patient participation: Yes- Able to Participate  Level of consciousness: awake and alert and oriented  Post-procedure vital signs: reviewed and stable  Pain management: adequate  Airway patency: patent  PONV status at discharge: No PONV  Anesthetic complications: no      Cardiovascular status: hemodynamically stable  Respiratory status: unassisted, spontaneous ventilation and room air  Hydration status: euvolemic  Follow-up not needed.        Visit Vitals  /82 (BP Location: Left arm, Patient Position: Sitting)   Pulse (!) 56   Temp 36.4 °C (97.5 °F) (Oral)   Resp 16   Ht 6' (1.829 m)   Wt 127 kg (280 lb)   SpO2 95%   BMI 37.97 kg/m²       Pain/Alvaro Score: Pain Assessment Performed: Yes (1/15/2018 11:46 AM)  Presence of Pain: complains of pain/discomfort (1/15/2018 11:46 AM)

## 2018-01-15 NOTE — INTERVAL H&P NOTE
The patient has been examined and the H&P has been reviewed:    I concur with the findings and no changes have occurred since H&P was written.     HPI  Mr. Dhillon presents for scheduled spinal cord stimulator implant.  He reports no change in pain symptoms; no change in location, quality, or intensity. Reported 80-90% relief of painful symptoms during trial period. Denies new medical issues since previous evaluation.     PMHx, PSHx, Allergies, Medications reviewed in epic    ROS negative except pain complaints in HPI    OBJECTIVE:    /82 (BP Location: Left arm, Patient Position: Sitting)   Pulse (!) 56   Temp 97.5 °F (36.4 °C) (Oral)   Resp 16   Ht 6' (1.829 m)   Wt 127 kg (280 lb)   SpO2 95%   BMI 37.97 kg/m²     PHYSICAL EXAMINATION:    GENERAL: Well appearing, in no acute distress, alert and oriented x3.  PSYCH:  Mood and affect appropriate.  SKIN: Skin color, texture, turgor normal, no rashes or lesions.  CV: RRR with palpation of the radial artery.  PULM: No evidence of respiratory difficulty, symmetric chest rise. Clear to auscultation.  NEURO: Cranial nerves grossly intact. 5/5 motor BUE and BLE    Plan:    Proceed with procedure as planned    Brandon Saha  01/15/2018      Anesthesia/Surgery risks, benefits and alternative options discussed and understood by patient/family.          There are no hospital problems to display for this patient.

## 2018-01-16 NOTE — PLAN OF CARE
Constance Dhillon has met all discharge criteria from Phase II. Vital Signs are stable, ambulating  without difficulty.Pain is now under control and tolerable for the pt. Pain score 4 at this time.  Discharge instructions given, patient verbalized understanding. Discharged from facility via wheelchair in stable condition.

## 2018-01-16 NOTE — DISCHARGE INSTRUCTIONS
Anesthesia: Monitored Anesthesia Care (MAC)      What is monitored anesthesia care?  MAC keeps you very drowsy during surgery. You may be awake, but you will likely not remember much. And you wont feel pain. With MAC, medicines are given through an IV line into a vein in your arm or hand. A local anesthetic will usually be injected into the skin and muscle around the surgical site to numb it. The anesthesia provider monitors you during the procedure. He or she checks your heart rate and rhythm, blood pressure, and blood oxygen level.  Anesthesia tools and medicines that may be near you during your procedure  You will likely have:  · A pulse oximeter on the end of your finger. This measures your blood oxygen level.  · Electrocardiography leads (electrodes) on your chest. These record your heart rate and rhythm.  · Medicines given through an IV. These relax you and prevent pain. You may be awake or sleep lightly. If you have local anesthetic, it is injected directly into your skin.  · A facemask to give you oxygen, if needed.  Risks and possible complications  MAC has some risks. These include:  · Breathing problems  · Nausea and vomiting  · Allergic reaction to the anesthetic    Anesthesia safety  Tips for anesthesia safety include the following:   · Follow all instructions you are given for how long not to eat or drink before your procedure.  · Be sure your healthcare provider knows what medicines you take, especially any anti-inflammatory medicine or blood thinners. This includes aspirin and any other over-the-counter medicines, herbs, and supplements.  · Have an adult family member or friend drive you home after the procedure.  · For the first 24 hours after your surgery:  ¨ Do not drive or use heavy equipment.  ¨ Do not make important decisions or sign documents.  ¨ Avoid alcohol.  ¨ Have someone stay with you, if possible. They can watch for problems and help keep you safe.  Date Last Reviewed: 12/1/2016  ©  6633-8084 [employer]. [address] [place] [address], [place] [address]. All rights reserved. This information is not intended as a substitute for professional medical care. Always follow your healthcare professional's instructions.

## 2018-01-22 ENCOUNTER — HOSPITAL ENCOUNTER (OUTPATIENT)
Dept: RADIOLOGY | Facility: OTHER | Age: 68
Discharge: HOME | End: 2018-01-22
Attending: ANESTHESIOLOGY
Payer: MEDICARE

## 2018-01-22 ENCOUNTER — OFFICE VISIT (OUTPATIENT)
Dept: PAIN MEDICINE | Facility: CLINIC | Age: 68
End: 2018-01-22
Attending: ANESTHESIOLOGY
Payer: MEDICARE

## 2018-01-22 VITALS
DIASTOLIC BLOOD PRESSURE: 82 MMHG | SYSTOLIC BLOOD PRESSURE: 138 MMHG | BODY MASS INDEX: 37.62 KG/M2 | TEMPERATURE: 99 F | HEART RATE: 100 BPM | WEIGHT: 277.75 LBS | HEIGHT: 72 IN | RESPIRATION RATE: 18 BRPM

## 2018-01-22 DIAGNOSIS — Z96.89 SPINAL CORD STIMULATOR STATUS: ICD-10-CM

## 2018-01-22 PROCEDURE — 72052 X-RAY EXAM NECK SPINE 6/>VWS: CPT | Mod: TC,FY

## 2018-01-22 PROCEDURE — 72070 X-RAY EXAM THORAC SPINE 2VWS: CPT | Mod: TC,FY

## 2018-01-22 PROCEDURE — 99024 POSTOP FOLLOW-UP VISIT: CPT | Mod: S$GLB,,, | Performed by: ANESTHESIOLOGY

## 2018-01-22 PROCEDURE — 99999 PR PBB SHADOW E&M-EST. PATIENT-LVL V: CPT | Mod: PBBFAC,,, | Performed by: ANESTHESIOLOGY

## 2018-01-22 PROCEDURE — 72070 X-RAY EXAM THORAC SPINE 2VWS: CPT | Mod: 26,,, | Performed by: RADIOLOGY

## 2018-01-22 PROCEDURE — 72114 X-RAY EXAM L-S SPINE BENDING: CPT | Mod: TC,FY

## 2018-01-22 PROCEDURE — 72052 X-RAY EXAM NECK SPINE 6/>VWS: CPT | Mod: 26,,, | Performed by: RADIOLOGY

## 2018-01-22 PROCEDURE — 72114 X-RAY EXAM L-S SPINE BENDING: CPT | Mod: 26,,, | Performed by: RADIOLOGY

## 2018-01-22 NOTE — PROGRESS NOTES
Chronic Pain - New Consult    Referring Physician: No ref. provider found    Chief Complaint:   Chief Complaint   Patient presents with    Arm Pain        SUBJECTIVE: Disclaimer: This note has been generated using voice-recognition software. There may be typographical errors that have been missed during proof-reading    Interval history 01/22/2018  The patient returns to the clinic for a postop visit following insertion stimulator-dorsal column on 01/15/2018,  patient denies any postprocedural fevers or chills no redness or tenderness over the incision sites no pus or drainage or signs of cauda equina syndrome.  He is describing some postural changes in the cervical spine, and is scheduled to have his stimulator re-programmed.  He continues to take his antibiotics and has used his pain medications for incisional pain appropriately.  He has worn his abdominal binder as directed.  He states that his lower extremity pain is completely resolved with the stimulator.    Initial encounter:    Constance Dhillon presents to the clinic for the evaluation of Bilateral Shoulder, Bilateral Knee, Bilateral feet and Lower back pain. The pain started over 18 years ago following an work accident and symptoms have been unchanged. States the pain has progressively gotten over time. States he has had pain for many years and has had multiple medications, injections, PT, topical creams, and acupuncture with minimal relief. He is followed by Dr. Earl Marrero who referred him here today to be evaluated for permanent spinal cord stimulator implantation. He most recently had a trial for the lower back and leg pain 3 weeks ago with [employer] and got 50+% reduction in his pain with much improved function. He also had a C-spine SCS trial on 9/18/17 and had 80+% reduction in his pain. He is here today hoping to move forward with the SCS permanent implants. He takes a baby aspirin daily. Denies bowel or bladder incontinence. His biggest  pain today is his right foot. He had excellent coverage during the trial and was able to walk up the stairs for the first time in years.     Brief history:    Pain Description:    The pain is located in the Lower back area and radiates to the Bilateral Lower extremity.      At BEST  4/10     At WORST  10/10 on the WORST day.      On average pain is rated as 6/10.     Today the pain is rated as 7/10    The pain is described as aching, numbing, sharp, shooting and tingling      Symptoms interfere with daily activity and sleeping.     Exacerbating factors: Standing, Walking, Lifting and Getting out of bed/chair.      Mitigating factors medications and SCS trial.     Patient denies night fever/night sweats, urinary incontinence, bowel incontinence, significant weight loss, significant motor weakness.  Patient denies any suicidal or homicidal ideations    Pain Medications:  Current:  Celebrex  Fentanyl 25 mcg       Tried in Past:  NSAIDs -Never  TCA -Never  SNRI -Never  Anti-convulsants -Never  Muscle Relaxants -all of them   Opioids-Never    Physical Therapy/Home Exercise: yes       report:      Pain Procedures: multiple injections in the past with minimal relief. The most he got with an injection was 3 weeks.   01/15/2018 INSERTION-STIMULATOR-DORSAL COLUMN 2 cervical leads, 2 thoracic leads and one internal battery  Chiropractor -yes  Acupuncture - yes  TENS unit -never  Spinal decompression -never  Joint replacement -bilateral TKA, right ankle arthrodesis    Imaging:   X-ray of Knee  Right knee.    Total knee replacement    Total right knee arthroplasty with drain in place    Impression total right knee arthroplasty      Electronically signed by: ERIBERTO JUAREZ MD  Date: 09/26/17  Time: 11:35     Past Medical History:   Diagnosis Date    Arthritis     post-traumatic arthritis R ankle; L knee arthritis s/p TKA, R knee arthritis    Benign essential tremor     Encounter for blood transfusion     Hypertension      Jaundice     CHILDHOOD    Refusal of blood transfusions as patient is Druze     Renal calculi     Shingles     Situs inversus totalis     Sleep apnea     PAST H/O, STATES NO PROBLEMS NOW    SOB (shortness of breath)     Status post VNS (vagus nerve stimulator) placement     Wears glasses     White coat syndrome      Past Surgical History:   Procedure Laterality Date    ANKLE FRACTURE SURGERY Right     FUSION    CARPAL TUNNEL RELEASE      GASTROPLASTY  1996    HAND SURGERY Right 1977    Carpal tunnel    JOINT REPLACEMENT Left     TKR bilateral    WRIST SURGERY Right 1972    LACERATION REPAIR     Social History     Social History    Marital status:      Spouse name: N/A    Number of children: N/A    Years of education: 12     Occupational History    Not on file.     Social History Main Topics    Smoking status: Never Smoker    Smokeless tobacco: Never Used    Alcohol use Yes      Comment: every blue moon    Drug use: No    Sexual activity: Yes     Partners: Female      Comment:      Other Topics Concern    Not on file     Social History Narrative    Lives with wife in [place]; lived in [facility] for many years. Daughter in [place], recently .      Family History   Problem Relation Age of Onset    Other Mother      brain tumor    Diabetes Father      DM2    Heart disease Brother      CAD, pacemaker; heavy EtOH use    Alcohol abuse Brother        Review of patient's allergies indicates:  No Known Allergies    Current Outpatient Prescriptions   Medication Sig    amLODIPine (NORVASC) 10 MG tablet Take 1 tablet (10 mg total) by mouth once daily.    aspirin 81 MG Chew Take 1 tablet (81 mg total) by mouth once daily. (Patient taking differently: Take 81 mg by mouth once daily. Will stop 1/10)    atorvastatin (LIPITOR) 80 MG tablet Take 1 tablet (80 mg total) by mouth every evening.    carvedilol (COREG) 25 MG tablet Take 1 tablet (25 mg total) by mouth 2 (two)  times daily with meals. 1/2 twice a day for 2 weeks, then a whole pill twice a day-with food    cholecalciferol, vitamin D3, (VITAMIN D3) 5,000 unit Tab Take 5,000 Units by mouth once daily.    cloNIDine (CATAPRES) 0.3 MG tablet Take 0.3 mg by mouth 2 (two) times daily.    doxycycline (MONODOX) 100 MG capsule Take 1 capsule (100 mg total) by mouth 2 (two) times daily.    fentaNYL (DURAGESIC) 25 mcg/hr Place 1 patch onto the skin every 72 hours.    fish oil-omega-3 fatty acids 300-1,000 mg capsule Take 2 g by mouth every morning.     furosemide (LASIX) 40 MG tablet Take 1 tablet (40 mg total) by mouth once daily.    losartan (COZAAR) 100 MG tablet Take 1 tablet (100 mg total) by mouth every evening.    MULTIVITAMIN (MULTIPLE VITAMINS DAILY ORAL) Take 1 tablet by mouth every morning.    oxyCODONE-acetaminophen (PERCOCET) 5-325 mg per tablet Take 1-2 tablets by mouth every 4 (four) hours as needed for Pain.    potassium gluconate 595 mg (99 mg) Tab Take 1 tablet by mouth every morning.    spironolactone (ALDACTONE) 50 MG tablet Take 1 tablet (50 mg total) by mouth once daily.    TURMERIC/TURMERIC EXT/PEPR EXT (TURMERIC-TURMERIC EXT-PEPPER) 500-3 mg Cap Take 1 capsule by mouth once daily.    vitamin E 400 UNIT capsule Take 400 Units by mouth once daily.     No current facility-administered medications for this visit.        REVIEW OF SYSTEMS:    GENERAL:  No weight loss, malaise or fevers.  RESPIRATORY:  Negative for cough, wheezing or shortness of breath, patient denies any recent URI.  CARDIOVASCULAR:  Negative for chest pain, leg swelling or palpitations.  GI:  Negative for abdominal discomfort, blood in stools or black stools or change in bowel habits.  MUSCULOSKELETAL:  See HPI.  SKIN:  Negative for lesions, rash, and itching.  PSYCH:  No mood disorder or recent psychosocial stressors.  Patients sleep is not disturbed secondary to pain.  HEMATOLOGY/LYMPHOLOGY:  Negative for prolonged bleeding, bruising  easily or swollen nodes.  Patient is not currently taking any anti-coagulants  ENDO: No history of diabetes or thyroid dysfunction  NEURO:   No history of headaches, syncope, paralysis, seizures or tremors.  All other reviewed and negative other than HPI.    OBJECTIVE:    /82   Pulse 100   Temp 99.4 °F (37.4 °C)   Resp 18   Ht 6' (1.829 m)   Wt 126 kg (277 lb 12.5 oz)   BMI 37.67 kg/m²     PHYSICAL EXAMINATION:    GENERAL: Well appearing, in no acute distress, alert and oriented x3.  PSYCH:  Mood and affect appropriate.  SKIN: Skin color, texture, turgor normal, no rashes or lesions.  HEAD/FACE:  Normocephalic, atraumatic.   NECK: Positional paresthesia with stimulation.  CV: RRR with palpation of the radial artery.  PULM: No evidence of respiratory difficulty, symmetric chest rise.  GI:  Soft and non-tender.  BACK: No evidence of infection from any of the incision sites no evidence of dehiscence, staple line looks appropriate and no evidence of infection.  NEURO: Cranial nerves grossly intact.  GAIT: normal.    ASSESSMENT: 67 y.o. year old male with pain, consistent with     Encounter Diagnosis   Name Primary?    Spinal cord stimulator status        PLAN:     X-rays of the cervical spine with flexion extension and thoracic and lumbar spine to evaluate spinal cord stim later status and lead positions.    Discontinue use of abdominal binder    Can shower but no soaks or baths    Continue antibiotics until completed.    Follow-up Friday with [facility] for staple removal.    Activity restrictions remain for an additional 5 weeks.    Olu Garcias  01/22/2018

## 2018-01-26 ENCOUNTER — OFFICE VISIT (OUTPATIENT)
Dept: PAIN MEDICINE | Facility: CLINIC | Age: 68
End: 2018-01-26
Payer: MEDICARE

## 2018-01-26 VITALS
HEART RATE: 92 BPM | WEIGHT: 275.81 LBS | RESPIRATION RATE: 16 BRPM | TEMPERATURE: 97 F | DIASTOLIC BLOOD PRESSURE: 85 MMHG | SYSTOLIC BLOOD PRESSURE: 136 MMHG | BODY MASS INDEX: 37.36 KG/M2 | HEIGHT: 72 IN

## 2018-01-26 DIAGNOSIS — Z96.89 SPINAL CORD STIMULATOR STATUS: ICD-10-CM

## 2018-01-26 DIAGNOSIS — M79.2 NEUROPATHIC PAIN OF ANKLE, RIGHT: ICD-10-CM

## 2018-01-26 DIAGNOSIS — G89.4 CHRONIC PAIN SYNDROME: Primary | ICD-10-CM

## 2018-01-26 DIAGNOSIS — M54.12 CERVICAL RADICULOPATHY: ICD-10-CM

## 2018-01-26 PROCEDURE — 99999 PR PBB SHADOW E&M-EST. PATIENT-LVL IV: CPT | Mod: PBBFAC,,, | Performed by: NURSE PRACTITIONER

## 2018-01-26 PROCEDURE — 99024 POSTOP FOLLOW-UP VISIT: CPT | Mod: S$GLB,,, | Performed by: NURSE PRACTITIONER

## 2018-01-26 NOTE — PROGRESS NOTES
Chronic Pain - Established Visit    Referring Physician: No ref. provider found    Chief Complaint:   Chief Complaint   Patient presents with    Knee Pain     right knee pain         SUBJECTIVE: Disclaimer: This note has been generated using voice-recognition software. There may be typographical errors that have been missed during proof-reading    Interval History 01/26/2018:  The patient presents today for follow up and staple removal.  He did have updated XRAYs after his visit on 1/22/16.  There was some migration of one of his cervical leads one vertebral body downward. He met with [facility] and was programmed from that imaging.  He has noticed some improvement in this tremor since this time.  He has significant benefit of left upper extremity pain.  His is still having some symptoms to the right upper extremity.  His lower back and leg pain is well controlled.  He denies any drainage from his incisions.  He denies any fever or malaise.  He completed his antibiotics.  His pain today is 4/10.     Interval History 01/22/2018  The patient returns to the clinic for a postop visit following insertion stimulator-dorsal column on 01/15/2018,  patient denies any postprocedural fevers or chills no redness or tenderness over the incision sites no pus or drainage or signs of cauda equina syndrome.  He is describing some postural changes in the cervical spine, and is scheduled to have his stimulator re-programmed.  He continues to take his antibiotics and has used his pain medications for incisional pain appropriately.  He has worn his abdominal binder as directed.  He states that his lower extremity pain is completely resolved with the stimulator.    Initial encounter:    Constance Dhillon presents to the clinic for the evaluation of Bilateral Shoulder, Bilateral Knee, Bilateral feet and Lower back pain. The pain started over 18 years ago following an work accident and symptoms have been unchanged. States the pain has  progressively gotten over time. States he has had pain for many years and has had multiple medications, injections, PT, topical creams, and acupuncture with minimal relief. He is followed by Dr. Earl Marrero who referred him here today to be evaluated for permanent spinal cord stimulator implantation. He most recently had a trial for the lower back and leg pain 3 weeks ago with [employer] and got 50+% reduction in his pain with much improved function. He also had a C-spine SCS trial on 9/18/17 and had 80+% reduction in his pain. He is here today hoping to move forward with the SCS permanent implants. He takes a baby aspirin daily. Denies bowel or bladder incontinence. His biggest pain today is his right foot. He had excellent coverage during the trial and was able to walk up the stairs for the first time in years.     Brief history:    Pain Description:    The pain is located in the Lower back area and radiates to the Bilateral Lower extremity.      At BEST  4/10     At WORST  10/10 on the WORST day.      On average pain is rated as 6/10.     Today the pain is rated as 7/10    The pain is described as aching, numbing, sharp, shooting and tingling      Symptoms interfere with daily activity and sleeping.     Exacerbating factors: Standing, Walking, Lifting and Getting out of bed/chair.      Mitigating factors medications and SCS trial.     Patient denies night fever/night sweats, urinary incontinence, bowel incontinence, significant weight loss, significant motor weakness.  Patient denies any suicidal or homicidal ideations    Pain Medications:  Current:  Celebrex  Fentanyl 25 mcg       Tried in Past:  NSAIDs - Celebrex  TCA -Never  SNRI -Never  Anti-convulsants -Never  Muscle Relaxants -all of them   Opioids- Fentanyl    Physical Therapy/Home Exercise: yes       report:      Pain Procedures: multiple injections in the past with minimal relief. The most he got with an injection was 3 weeks.   01/15/2018  INSERTION-STIMULATOR-DORSAL COLUMN 2 cervical leads, 2 thoracic leads and one internal battery  Chiropractor -yes  Acupuncture - yes  TENS unit -never  Spinal decompression -never  Joint replacement -bilateral TKA, right ankle arthrodesis    Imaging:   X-ray of Knee  Right knee.    Total knee replacement    Total right knee arthroplasty with drain in place    Impression total right knee arthroplasty      Electronically signed by: ERIBERTO JUAREZ MD  Date: 09/26/17  Time: 11:35     Past Medical History:   Diagnosis Date    Arthritis     post-traumatic arthritis R ankle; L knee arthritis s/p TKA, R knee arthritis    Benign essential tremor     Encounter for blood transfusion     Hypertension     Jaundice     CHILDHOOD    Refusal of blood transfusions as patient is [employer]     Renal calculi     Shingles     Situs inversus totalis     Sleep apnea     PAST H/O, STATES NO PROBLEMS NOW    SOB (shortness of breath)     Status post VNS (vagus nerve stimulator) placement     Wears glasses     White coat syndrome      Past Surgical History:   Procedure Laterality Date    ANKLE FRACTURE SURGERY Right     FUSION    CARPAL TUNNEL RELEASE      GASTROPLASTY  1996    HAND SURGERY Right 1977    Carpal tunnel    JOINT REPLACEMENT Left     TKR bilateral    WRIST SURGERY Right 1972    LACERATION REPAIR     Social History     Social History    Marital status:      Spouse name: N/A    Number of children: N/A    Years of education: 12     Occupational History    Not on file.     Social History Main Topics    Smoking status: Never Smoker    Smokeless tobacco: Never Used    Alcohol use Yes      Comment: every blue moon    Drug use: No    Sexual activity: Yes     Partners: Female      Comment:      Other Topics Concern    Not on file     Social History Narrative    Lives with wife in [place]; lived in [facility] for many years. Daughter in [place], recently .      Family History   Problem  Relation Age of Onset    Other Mother      brain tumor    Diabetes Father      DM2    Heart disease Brother      CAD, pacemaker; heavy EtOH use    Alcohol abuse Brother        Review of patient's allergies indicates:  No Known Allergies    Current Outpatient Prescriptions   Medication Sig    amLODIPine (NORVASC) 10 MG tablet Take 1 tablet (10 mg total) by mouth once daily.    aspirin 81 MG Chew Take 1 tablet (81 mg total) by mouth once daily. (Patient taking differently: Take 81 mg by mouth once daily. Will stop 1/10)    atorvastatin (LIPITOR) 80 MG tablet Take 1 tablet (80 mg total) by mouth every evening.    carvedilol (COREG) 25 MG tablet Take 1 tablet (25 mg total) by mouth 2 (two) times daily with meals. 1/2 twice a day for 2 weeks, then a whole pill twice a day-with food    cholecalciferol, vitamin D3, (VITAMIN D3) 5,000 unit Tab Take 5,000 Units by mouth once daily.    cloNIDine (CATAPRES) 0.3 MG tablet Take 0.3 mg by mouth 2 (two) times daily.    doxycycline (MONODOX) 100 MG capsule Take 1 capsule (100 mg total) by mouth 2 (two) times daily.    fentaNYL (DURAGESIC) 25 mcg/hr Place 1 patch onto the skin every 72 hours.    fish oil-omega-3 fatty acids 300-1,000 mg capsule Take 2 g by mouth every morning.     furosemide (LASIX) 40 MG tablet Take 1 tablet (40 mg total) by mouth once daily.    losartan (COZAAR) 100 MG tablet Take 1 tablet (100 mg total) by mouth every evening.    MULTIVITAMIN (MULTIPLE VITAMINS DAILY ORAL) Take 1 tablet by mouth every morning.    oxyCODONE-acetaminophen (PERCOCET) 5-325 mg per tablet Take 1-2 tablets by mouth every 4 (four) hours as needed for Pain.    potassium gluconate 595 mg (99 mg) Tab Take 1 tablet by mouth every morning.    spironolactone (ALDACTONE) 50 MG tablet Take 1 tablet (50 mg total) by mouth once daily.    TURMERIC/TURMERIC EXT/PEPR EXT (TURMERIC-TURMERIC EXT-PEPPER) 500-3 mg Cap Take 1 capsule by mouth once daily.    vitamin E 400 UNIT capsule  Take 400 Units by mouth once daily.     No current facility-administered medications for this visit.        REVIEW OF SYSTEMS:    GENERAL:  No weight loss, malaise or fevers.  RESPIRATORY:  Negative for cough, wheezing or shortness of breath, patient denies any recent URI.  CARDIOVASCULAR:  Negative for chest pain, leg swelling or palpitations.  GI:  Negative for abdominal discomfort, blood in stools or black stools or change in bowel habits.  MUSCULOSKELETAL:  See HPI.  SKIN:  Negative for lesions, rash, and itching.  PSYCH:  No mood disorder or recent psychosocial stressors.  Patients sleep is not disturbed secondary to pain.  HEMATOLOGY/LYMPHOLOGY:  Negative for prolonged bleeding, bruising easily or swollen nodes.  Patient is not currently taking any anti-coagulants  ENDO: No history of diabetes or thyroid dysfunction  NEURO:   No history of headaches, syncope, paralysis, seizures or tremors.  All other reviewed and negative other than HPI.    OBJECTIVE:    /85   Pulse 92   Temp 97.1 °F (36.2 °C) (Oral)   Resp 16   Ht 6' (1.829 m)   Wt 125.1 kg (275 lb 12.8 oz)   BMI 37.41 kg/m²     PHYSICAL EXAMINATION:    GENERAL: Well appearing, in no acute distress, alert and oriented x3.  PSYCH:  Mood and affect appropriate.  SKIN: Skin color, texture, turgor normal, no rashes or lesions. SCS sites x3 without drainage or signs of infection.  Staples removed without difficulty.  Area cleaned with alcohol and Bacitracin applied.    HEAD/FACE:  Normocephalic, atraumatic.   NECK: There is no positional paresthesia with stimulation.  There is some pain with lateral rotation to the right.    CV: RRR with palpation of the radial artery.  PULM: No evidence of respiratory difficulty, symmetric chest rise.  GI:  Soft and non-tender.  BACK: No evidence of infection from any of the incision sites no evidence of dehiscence, staple line looks appropriate and no evidence of infection.  NEURO: Cranial nerves grossly  intact.  GAIT: Normal.              ASSESSMENT: 67 y.o. year old male with pain, consistent with     Encounter Diagnoses   Name Primary?    Chronic pain syndrome Yes    Spinal cord stimulator status     Neuropathic pain of ankle, right     Cervical radiculopathy        PLAN:     - X-rays reviewed with patient today.  There was some migration of a cervical lead.  However, [facility] reprogrammed the patient from the new imaging.  He still has some pain to the right upper extremity and is meeting with [facility] again next week.    - Continue activity restrictions until 6 weeks post op.    - Apply Bacitracin BID for one more week.    - He is following up with Dr. Marrero in 2 weeks.  He is aware to contact us with any increased redness, drainage or fever.      The above plan and management options were discussed at length with patient. Patient is in agreement with the above and verbalized understanding.       Ada Mcnulty  01/26/2018

## 2018-03-15 PROBLEM — I1A.0 RESISTANT HYPERTENSION: Status: ACTIVE | Noted: 2018-03-15

## 2018-04-16 PROBLEM — Z13.6 SCREENING FOR AAA (ABDOMINAL AORTIC ANEURYSM): Status: RESOLVED | Noted: 2018-01-12 | Resolved: 2018-04-16

## 2019-03-15 PROBLEM — Z13.6 SCREENING FOR AAA (ABDOMINAL AORTIC ANEURYSM): Status: ACTIVE | Noted: 2019-03-15

## 2019-03-15 PROBLEM — R06.09 DYSPNEA ON EXERTION: Status: ACTIVE | Noted: 2019-03-15

## 2019-03-15 PROBLEM — E87.6 HYPOKALEMIA: Status: ACTIVE | Noted: 2019-03-15

## 2019-03-15 PROBLEM — I51.7 LEFT VENTRICULAR ENLARGEMENT: Status: ACTIVE | Noted: 2019-03-15

## 2019-03-15 PROBLEM — I51.7 LEFT VENTRICULAR HYPERTROPHY: Status: ACTIVE | Noted: 2019-03-15

## 2019-03-15 PROBLEM — R94.2 ABNORMAL PFT: Status: ACTIVE | Noted: 2019-03-15

## 2019-03-15 PROBLEM — I34.0 NON-RHEUMATIC MITRAL REGURGITATION: Status: ACTIVE | Noted: 2019-03-15

## 2019-03-15 PROBLEM — G47.33 OSA ON CPAP: Status: ACTIVE | Noted: 2019-03-15

## 2019-03-15 PROBLEM — R06.02 SHORTNESS OF BREATH: Status: ACTIVE | Noted: 2019-03-15

## 2019-03-15 PROBLEM — Z99.81 ON HOME OXYGEN THERAPY: Status: ACTIVE | Noted: 2019-03-15

## 2019-03-15 PROBLEM — R94.2 RESTRICTIVE PATTERN PRESENT ON PULMONARY FUNCTION TESTING: Status: ACTIVE | Noted: 2019-03-15

## 2019-03-15 PROBLEM — I36.1 NON-RHEUMATIC TRICUSPID VALVE INSUFFICIENCY: Status: ACTIVE | Noted: 2019-03-15

## 2019-03-15 PROBLEM — J62.8: Status: ACTIVE | Noted: 2019-03-15

## 2019-05-15 PROBLEM — I35.1 NONRHEUMATIC AORTIC VALVE INSUFFICIENCY: Status: ACTIVE | Noted: 2019-05-15

## 2019-05-15 PROBLEM — Q21.12 PATENT FORAMEN OVALE WITH RIGHT TO LEFT SHUNT: Status: ACTIVE | Noted: 2019-05-15

## 2019-05-15 PROBLEM — E83.52 HYPERCALCEMIA: Status: ACTIVE | Noted: 2019-05-15

## 2019-05-15 PROBLEM — I77.819 AORTIC DILATATION: Status: ACTIVE | Noted: 2019-05-15

## 2019-06-17 PROBLEM — Z13.6 SCREENING FOR AAA (ABDOMINAL AORTIC ANEURYSM): Status: RESOLVED | Noted: 2019-03-15 | Resolved: 2019-06-17

## 2019-06-24 ENCOUNTER — OFFICE VISIT (OUTPATIENT)
Dept: NEUROLOGY | Facility: CLINIC | Age: 69
End: 2019-06-24
Payer: MEDICARE

## 2019-06-24 ENCOUNTER — LAB VISIT (OUTPATIENT)
Dept: LAB | Facility: HOSPITAL | Age: 69
End: 2019-06-24
Attending: PSYCHIATRY & NEUROLOGY
Payer: MEDICARE

## 2019-06-24 VITALS
HEART RATE: 56 BPM | RESPIRATION RATE: 18 BRPM | BODY MASS INDEX: 37.95 KG/M2 | SYSTOLIC BLOOD PRESSURE: 148 MMHG | WEIGHT: 280.19 LBS | DIASTOLIC BLOOD PRESSURE: 78 MMHG | HEIGHT: 72 IN

## 2019-06-24 DIAGNOSIS — R25.1 TREMOR: ICD-10-CM

## 2019-06-24 DIAGNOSIS — R25.1 TREMOR: Primary | ICD-10-CM

## 2019-06-24 LAB — TSH SERPL DL<=0.005 MIU/L-ACNC: 3.36 UIU/ML (ref 0.4–4)

## 2019-06-24 PROCEDURE — 99999 PR PBB SHADOW E&M-EST. PATIENT-LVL III: CPT | Mod: PBBFAC,,, | Performed by: PSYCHIATRY & NEUROLOGY

## 2019-06-24 PROCEDURE — 36415 COLL VENOUS BLD VENIPUNCTURE: CPT

## 2019-06-24 PROCEDURE — 99999 PR PBB SHADOW E&M-EST. PATIENT-LVL III: ICD-10-PCS | Mod: PBBFAC,,, | Performed by: PSYCHIATRY & NEUROLOGY

## 2019-06-24 PROCEDURE — 3078F DIAST BP <80 MM HG: CPT | Mod: CPTII,S$GLB,, | Performed by: PSYCHIATRY & NEUROLOGY

## 2019-06-24 PROCEDURE — 3077F SYST BP >= 140 MM HG: CPT | Mod: CPTII,S$GLB,, | Performed by: PSYCHIATRY & NEUROLOGY

## 2019-06-24 PROCEDURE — 99204 OFFICE O/P NEW MOD 45 MIN: CPT | Mod: S$GLB,,, | Performed by: PSYCHIATRY & NEUROLOGY

## 2019-06-24 PROCEDURE — 3077F PR MOST RECENT SYSTOLIC BLOOD PRESSURE >= 140 MM HG: ICD-10-PCS | Mod: CPTII,S$GLB,, | Performed by: PSYCHIATRY & NEUROLOGY

## 2019-06-24 PROCEDURE — 1101F PT FALLS ASSESS-DOCD LE1/YR: CPT | Mod: CPTII,S$GLB,, | Performed by: PSYCHIATRY & NEUROLOGY

## 2019-06-24 PROCEDURE — 3078F PR MOST RECENT DIASTOLIC BLOOD PRESSURE < 80 MM HG: ICD-10-PCS | Mod: CPTII,S$GLB,, | Performed by: PSYCHIATRY & NEUROLOGY

## 2019-06-24 PROCEDURE — 84443 ASSAY THYROID STIM HORMONE: CPT

## 2019-06-24 PROCEDURE — 1101F PR PT FALLS ASSESS DOC 0-1 FALLS W/OUT INJ PAST YR: ICD-10-PCS | Mod: CPTII,S$GLB,, | Performed by: PSYCHIATRY & NEUROLOGY

## 2019-06-24 PROCEDURE — 99204 PR OFFICE/OUTPT VISIT, NEW, LEVL IV, 45-59 MIN: ICD-10-PCS | Mod: S$GLB,,, | Performed by: PSYCHIATRY & NEUROLOGY

## 2019-06-24 RX ORDER — PRIMIDONE 50 MG/1
TABLET ORAL
Qty: 120 TABLET | Refills: 1 | Status: SHIPPED | OUTPATIENT
Start: 2019-06-24 | End: 2019-11-11

## 2019-06-24 RX ORDER — CLONIDINE 0.3 MG/24H
1 PATCH, EXTENDED RELEASE TRANSDERMAL
COMMUNITY
End: 2019-11-11

## 2019-06-24 RX ORDER — PRIMIDONE 50 MG/1
TABLET ORAL
Qty: 60 TABLET | Refills: 5 | Status: SHIPPED | OUTPATIENT
Start: 2019-06-24 | End: 2019-06-24 | Stop reason: SDUPTHER

## 2019-06-24 NOTE — PROGRESS NOTES
Consult from Dr Salamanca    HPI: Constance Dhillon is a 68 y.o. Male here for evaluation of tremor. Tremor primarily involves the bilateral hands. He noted this after injury of the right wrist many years ago. His shaking seemed to improve with Neurostimulator implantation.  However, for the past few years, he has noted bilateral thumb shaking with activities like eating or holding things. Resting helps. This is annoying with hands up and driving or holding hand in prolonged position. He has no voice tremor, drooling or difficulty with walking (other than what he knows is a prior ankle injury).  He has no VNS only neurostimulator for pain chronically from his prior injury and his chronic neck pain  No prescription medications given for tremor. No family history of tremor.   He does not drink much alcohol.         Note his history of Situs inversus totalis    He is retired from iron/constrution working.   He is here with his wife of 50 years. He moved here from [facility] a few years ago to be with family.         Review of Systems   Constitutional: Negative for fever.   HENT: Negative for nosebleeds.    Eyes: Negative for double vision.   Respiratory: Negative for hemoptysis.    Gastrointestinal: Negative for blood in stool.   Genitourinary: Negative for hematuria.   Musculoskeletal: Negative for falls and neck pain.   Skin: Negative for rash.   Neurological: Positive for tremors.   Psychiatric/Behavioral: Negative for memory loss.         I have reviewed all of this patient's past medical and surgical histories as well as family and social histories and active allergies and medications as documented in the electronic medical record.        Exam:  Gen Appearance, well developed/nourished in no apparent distress  CV: 2+ distal pulses with no edema or swelling  Neuro:  MS: Awake, alert, oriented to place, person, time, situation. Sustains attention. Recent/remote memory intact, Language is full to spontaneous  speech/repetition/naming/comprehension. Fund of Knowledge is full  CN: Optic discs are flat with normal vasculature, PERRL, Extraoccular movements and visual fields are full. Normal facial sensation and strength, Hearing symmetric, Tongue and Palate are midline and strong. Shoulder Shrug symmetric and strong.  Motor: Normal bulk, tone, no abnormal movements at rest, but tremor is noted in both hands outstretched and with action and persists after action with rest. 5/5 strength bilateral upper/lower extremities with 2+ reflexes and bilateral plantar response  Sensory: symmetric to light touch, pain, temp, and vibration/proprioception. Romberg negative  Cerebellar: Finger-nose,Heal-shin, Rapid alternating movements intact  Gait: Normal stance, no ataxia, no enbloc turning, no shuffling, and no impaired postural reflexes. There is mild persistence of tremor with walking    Imagin X ray C spine: Moderate disc/endplate and mild facet degeneration C4-C7.  Mild disc/endplate and facet degeneration L3-S1.  Labs: 2019 CMP reviewed      Assessment/Plan: Constance Dhillon is a 68 y.o. male with prior right hand tremor noted with neck and right arm injury (remote) and had responded to neuro-stimulator placement for years. Now with bilateral tremor with activities for the past few years. Tremor is present with action >>> rest on exam and there are no Parkinson's features at this time, otherwise.   I recommend:   1. CT head and TSH Note he has a spinal cord stimulator for chronic pain which helps greatly, but he can't have an MRI brain  2. Trial of primidone per orders for his mixed tremor type Unless sedation, mood changes or other side effects. He has mixed tremor at this point, but needs to be screened for any other PD features are reviewed.Further work up pending on his. His cervical radicular and hand pain are well controlled with his neurostimuloatr  3. Note his history of Situs inversus totalis    RTC 6 months  CC:  Salamanca

## 2019-06-24 NOTE — LETTER
June 24, 2019      Amelia Salamanca MD  [address] [facility] [address]           [facility]  [address] [facility] [address]  Phone: [phone]  Fax: [phone]          Patient: Constance Dhillon   MR Number: [id number]   YOB: 1950   Date of Visit: 6/24/2019       Dear Dr. Amelia Salamanca:    Thank you for referring Constance Dhillon to me for evaluation. Attached you will find relevant portions of my assessment and plan of care.    If you have questions, please do not hesitate to call me. I look forward to following Constance Dhillon along with you.    Sincerely,    Shiva Lemon MD    Enclosure  CC:  No Recipients    If you would like to receive this communication electronically, please contact [email or web address] or [phone] to request more information on [employer] Link access.    For providers and/or their staff who would like to refer a patient to [facility], please contact us through our one-stop-shop provider referral line, [facility] [place], at [phone].    If you feel you have received this communication in error or would no longer like to receive these types of communications, please e-mail [email or web address]

## 2019-06-28 ENCOUNTER — HOSPITAL ENCOUNTER (OUTPATIENT)
Dept: RADIOLOGY | Facility: HOSPITAL | Age: 69
Discharge: HOME | End: 2019-06-28
Attending: PSYCHIATRY & NEUROLOGY
Payer: MEDICARE

## 2019-06-28 DIAGNOSIS — R25.1 TREMOR: ICD-10-CM

## 2019-06-28 PROCEDURE — 70450 CT HEAD/BRAIN W/O DYE: CPT | Mod: TC

## 2019-11-11 ENCOUNTER — OFFICE VISIT (OUTPATIENT)
Dept: NEUROLOGY | Facility: CLINIC | Age: 69
End: 2019-11-11
Payer: MEDICARE

## 2019-11-11 VITALS
HEIGHT: 72 IN | HEART RATE: 76 BPM | SYSTOLIC BLOOD PRESSURE: 126 MMHG | WEIGHT: 275.56 LBS | DIASTOLIC BLOOD PRESSURE: 76 MMHG | BODY MASS INDEX: 37.32 KG/M2 | RESPIRATION RATE: 18 BRPM

## 2019-11-11 DIAGNOSIS — R25.1 TREMOR: Primary | ICD-10-CM

## 2019-11-11 DIAGNOSIS — R93.0 ABNORMAL HEAD CT: ICD-10-CM

## 2019-11-11 PROCEDURE — 1101F PT FALLS ASSESS-DOCD LE1/YR: CPT | Mod: CPTII,S$GLB,, | Performed by: PSYCHIATRY & NEUROLOGY

## 2019-11-11 PROCEDURE — 3078F PR MOST RECENT DIASTOLIC BLOOD PRESSURE < 80 MM HG: ICD-10-PCS | Mod: CPTII,S$GLB,, | Performed by: PSYCHIATRY & NEUROLOGY

## 2019-11-11 PROCEDURE — 99999 PR PBB SHADOW E&M-EST. PATIENT-LVL IV: ICD-10-PCS | Mod: PBBFAC,,, | Performed by: PSYCHIATRY & NEUROLOGY

## 2019-11-11 PROCEDURE — 99999 PR PBB SHADOW E&M-EST. PATIENT-LVL IV: CPT | Mod: PBBFAC,,, | Performed by: PSYCHIATRY & NEUROLOGY

## 2019-11-11 PROCEDURE — 1101F PR PT FALLS ASSESS DOC 0-1 FALLS W/OUT INJ PAST YR: ICD-10-PCS | Mod: CPTII,S$GLB,, | Performed by: PSYCHIATRY & NEUROLOGY

## 2019-11-11 PROCEDURE — 3074F PR MOST RECENT SYSTOLIC BLOOD PRESSURE < 130 MM HG: ICD-10-PCS | Mod: CPTII,S$GLB,, | Performed by: PSYCHIATRY & NEUROLOGY

## 2019-11-11 PROCEDURE — 99214 OFFICE O/P EST MOD 30 MIN: CPT | Mod: S$GLB,,, | Performed by: PSYCHIATRY & NEUROLOGY

## 2019-11-11 PROCEDURE — 99214 PR OFFICE/OUTPT VISIT, EST, LEVL IV, 30-39 MIN: ICD-10-PCS | Mod: S$GLB,,, | Performed by: PSYCHIATRY & NEUROLOGY

## 2019-11-11 PROCEDURE — 3078F DIAST BP <80 MM HG: CPT | Mod: CPTII,S$GLB,, | Performed by: PSYCHIATRY & NEUROLOGY

## 2019-11-11 PROCEDURE — 3074F SYST BP LT 130 MM HG: CPT | Mod: CPTII,S$GLB,, | Performed by: PSYCHIATRY & NEUROLOGY

## 2019-11-11 RX ORDER — PRIMIDONE 50 MG/1
TABLET ORAL
Qty: 270 TABLET | Refills: 3 | Status: SHIPPED | OUTPATIENT
Start: 2019-11-11 | End: 2020-11-23 | Stop reason: SDUPTHER

## 2019-11-11 NOTE — PROGRESS NOTES
HPI: Constance Dhillon is a 69 y.o. male with prior right hand tremor noted with neck and right arm injury (remote) and had responded to neuro-stimulator placement for years. Now with bilateral tremor with activities for the past few years. Tremor is present with action >>> rest on exam and there are no Parkinson's features at this time, otherwise.       Since the last visit, the patient tried primidone for his tremor. He has had benefit but not full benefit of this medication. Still has a bothersome, but its improved.  He is taking Primidone BID and tolerating well. Was helping more at first.   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   He is here with his wife of over 50 years. He moved here from [facility] a few years ago to be with family.         Review of Systems   Constitutional: Negative for fever.   HENT: Negative for nosebleeds.    Eyes: Negative for double vision.   Respiratory: Negative for hemoptysis.    Gastrointestinal: Negative for blood in stool.   Genitourinary: Negative for hematuria.   Musculoskeletal: Negative for falls and neck pain.   Skin: Negative for rash.   Neurological: Positive for tremors.   Psychiatric/Behavioral: Negative for memory loss.         I have reviewed all of this patient's past medical and surgical histories as well as family and social histories and active allergies and medications as documented in the electronic medical record.        Exam:  Gen Appearance, well developed/nourished in no apparent distress  CV: 2+ distal pulses with no edema or swelling  Neuro:  MS: Awake, alert, oriented to place, person, time, situation. Sustains attention. Recent/remote memory intact, Language is full to spontaneous speech/repetition/naming/comprehension. Fund of Knowledge is  full  CN: Optic discs are flat with normal vasculature, PERRL, Extraoccular movements and visual fields are full. Normal facial sensation and strength, Hearing symmetric, Tongue and Palate are midline and strong. Shoulder Shrug symmetric and strong.  Motor: Normal bulk, tone, no abnormal movements at rest, but tremor is noted in both hands outstretched and with action and persists after action with rest but mildly today. 5/5 strength bilateral upper/lower extremities with 2+ reflexes and no clonus  Sensory: symmetric to light touch, pain, temp, and vibration/proprioception. Romberg negative  Cerebellar: Finger-nose,Rapid alternating movements intact  Gait: Normal stance, no ataxia, no enbloc turning, no shuffling, and no impaired postural reflexes. There is mild persistence of tremor with walking and is in crutches today after recent ankle fusion    Imagin X ray C spine: Moderate disc/endplate and mild facet degeneration C4-C7.  Mild disc/endplate and facet degeneration L3-S1.    CT head 2019: Present examination is evidence of symmetric areas of low density changes identified in the posterior occiput marginating the posterior edge of the lateral ventricles that are more than expected for the presence of small vessel ischemic disease, although underlying demyelinating process is suspected and further diagnostic evaluation with MRI of the brain may be in order.    Labs: 2019 CMP reviewed  2019 TSH normal      Assessment/Plan: Constance Dhillon is a 69 y.o. male with prior right hand tremor noted with neck and right arm injury (remote) and had responded to neuro-stimulator placement for years. Now with bilateral tremor with activities for the past few years. Tremor is present with action >>> rest on exam and there are no Parkinson's features at this time, otherwise.   I recommend:     1. CT head 2019 showed white matter changes, possibly more than expected for small vessel ischemic change.  -Repeat Head CT  to try to demonstrate stability over time. These could be long standing or congenital changes  -Note he has a spinal cord stimulator for chronic pain which helps greatly, but he can't have an MRI brain  2. Trial of primidone  for his mixed tremor type has been helpful. Will raise dose TID for better effect, Unless sedation, mood changes or other side effects  -He has mixed tremor at this point, but needs to be screened for any other PD features are reviewed over time  -His cervical radicular and hand pain are well controlled with his neurostimuloator  3. Note his history of Situs inversus totalis    RTC 6 months

## 2019-11-15 ENCOUNTER — HOSPITAL ENCOUNTER (OUTPATIENT)
Dept: RADIOLOGY | Facility: HOSPITAL | Age: 69
Discharge: HOME | End: 2019-11-15
Attending: PSYCHIATRY & NEUROLOGY
Payer: MEDICARE

## 2019-11-15 DIAGNOSIS — R93.0 ABNORMAL HEAD CT: ICD-10-CM

## 2019-11-15 PROCEDURE — 70450 CT HEAD/BRAIN W/O DYE: CPT | Mod: TC

## 2020-11-23 RX ORDER — PRIMIDONE 50 MG/1
TABLET ORAL
Qty: 270 TABLET | Refills: 3 | Status: SHIPPED | OUTPATIENT
Start: 2020-11-23 | End: 2022-02-09

## 2020-11-23 NOTE — TELEPHONE ENCOUNTER
Constance desire refill of Primidone. LOV 11/11/19. Please advise.   Patient Active Problem List   Diagnosis    White coat syndrome    Situs inversus totalis    Ankle arthritis    Acute respiratory failure    Essential tremor    Chronic pain of right knee    Cough    Cellulitis of right knee    Acute on chronic respiratory failure    Chronic pain syndrome    Cervical radiculopathy    Neuropathic pain, leg, right    Chest pain    Heart failure with preserved ejection fraction, NYHA class II    Cardiomyopathy    Dextrocardia    Hyperlipidemia    Spinal cord stimulator status    Resistant hypertension    Left ventricular hypertrophy    Left ventricular enlargement    Non-rheumatic mitral regurgitation    Non-rheumatic tricuspid valve insufficiency    Abnormal PFTs (pulmonary function tests)    Dyspnea on exertion    On home oxygen therapy    Restrictive pattern present on pulmonary function testing    Shortness of breath    Silicatosis    BETO on CPAP    Hypokalemia    Patent foramen ovale with right to left shunt    Aortic dilatation    Nonrheumatic aortic valve insufficiency    Hypercalcemia     Prior to Admission medications    Medication Sig Start Date End Date Taking? Authorizing Provider   carvediloL (COREG) 25 MG tablet Take 1 tablet (25 mg total) by mouth 2 (two) times daily with meals. 3/17/20   Jose Roberto Dao MD   cholecalciferol, vitamin D3, (VITAMIN D3) 5,000 unit Tab Take 5,000 Units by mouth once daily.    Aaareferral Self   fish oil-omega-3 fatty acids 300-1,000 mg capsule Take 2 g by mouth every morning.     Historical Provider   furosemide (LASIX) 40 MG tablet TAKE ONE TABLET BY MOUTH ONCE DAILY 3/21/19   Rupert Irizarry MD   MULTIVITAMIN (MULTIPLE VITAMINS DAILY ORAL) Take 1 tablet by mouth every morning.    Historical Provider   primidone (MYSOLINE) 50 MG Tab Take one po TID 11/11/19   Shiva Lemon MD   spironolactone (ALDACTONE) 50 MG tablet Take 1 tablet (50  mg total) by mouth once daily. 5/16/19   Dustin Chavis MD   telmisartan (MICARDIS) 40 MG Tab TAKE 1 TABLET BY MOUTH ONCE DAILY AS DIRECTED FOR 90 DAYS 9/14/20   Historical Provider   traMADol (ULTRAM) 50 mg tablet Take 50 mg by mouth daily as needed. 3/15/18   Amelia Salamanca MD

## 2021-01-26 ENCOUNTER — PATIENT MESSAGE (OUTPATIENT)
Dept: ADMINISTRATIVE | Facility: OTHER | Age: 71
End: 2021-01-26

## 2021-01-27 ENCOUNTER — TELEPHONE (OUTPATIENT)
Dept: ADMINISTRATIVE | Facility: HOSPITAL | Age: 71
End: 2021-01-27

## 2021-02-05 ENCOUNTER — PATIENT MESSAGE (OUTPATIENT)
Dept: ADMINISTRATIVE | Facility: CLINIC | Age: 71
End: 2021-02-05

## 2021-06-03 PROBLEM — E78.5 DYSLIPIDEMIA: Status: ACTIVE | Noted: 2021-06-03

## 2022-02-09 ENCOUNTER — LAB VISIT (OUTPATIENT)
Dept: LAB | Facility: HOSPITAL | Age: 72
End: 2022-02-09
Attending: INTERNAL MEDICINE
Payer: MEDICARE

## 2022-02-09 DIAGNOSIS — R60.0 BILATERAL LOWER EXTREMITY EDEMA: ICD-10-CM

## 2022-02-09 DIAGNOSIS — R06.09 DOE (DYSPNEA ON EXERTION): ICD-10-CM

## 2022-02-09 DIAGNOSIS — I46.9 CARDIAC ARREST: ICD-10-CM

## 2022-02-09 PROBLEM — I27.20 PULMONARY HTN: Status: ACTIVE | Noted: 2022-02-09

## 2022-02-09 LAB — NT-PROBNP SERPL-MCNC: 811 PG/ML (ref 5–900)

## 2022-02-09 PROCEDURE — 83880 ASSAY OF NATRIURETIC PEPTIDE: CPT | Performed by: INTERNAL MEDICINE

## 2022-06-27 PROBLEM — E66.01 SEVERE OBESITY (BMI 35.0-39.9) WITH COMORBIDITY: Status: ACTIVE | Noted: 2022-06-27

## 2022-11-11 DIAGNOSIS — U07.1 COVID-19 VIRUS DETECTED: ICD-10-CM

## 2023-07-14 ENCOUNTER — TELEPHONE (OUTPATIENT)
Dept: PAIN MEDICINE | Facility: CLINIC | Age: 73
End: 2023-07-14
Payer: MEDICARE

## 2023-07-14 NOTE — TELEPHONE ENCOUNTER
----- Message from CECI Madrigal sent at 7/13/2023 11:05 AM CDT -----  Regarding: appointment  [facility]- the patient called about a procedure but we have not seen him in over 5 years. Can you please call and get him an appointment with a physician here as a new patient in clinic to discuss options. Thanks.

## 2023-08-09 ENCOUNTER — HOSPITAL ENCOUNTER (OUTPATIENT)
Dept: RADIOLOGY | Facility: OTHER | Age: 73
Discharge: HOME | End: 2023-08-09
Attending: ANESTHESIOLOGY
Payer: MEDICARE

## 2023-08-09 ENCOUNTER — OFFICE VISIT (OUTPATIENT)
Dept: PAIN MEDICINE | Facility: CLINIC | Age: 73
End: 2023-08-09
Payer: MEDICARE

## 2023-08-09 VITALS
HEART RATE: 65 BPM | HEIGHT: 72 IN | RESPIRATION RATE: 18 BRPM | BODY MASS INDEX: 38.82 KG/M2 | TEMPERATURE: 98 F | WEIGHT: 286.63 LBS | SYSTOLIC BLOOD PRESSURE: 105 MMHG | DIASTOLIC BLOOD PRESSURE: 67 MMHG | OXYGEN SATURATION: 100 %

## 2023-08-09 DIAGNOSIS — T85.192A MALFUNCTION OF SPINAL CORD STIMULATOR, INITIAL ENCOUNTER: ICD-10-CM

## 2023-08-09 DIAGNOSIS — T85.192A MALFUNCTION OF SPINAL CORD STIMULATOR, INITIAL ENCOUNTER: Primary | ICD-10-CM

## 2023-08-09 PROCEDURE — 72070 X-RAY EXAM THORAC SPINE 2VWS: CPT | Mod: TC,FY

## 2023-08-09 PROCEDURE — 1101F PR PT FALLS ASSESS DOC 0-1 FALLS W/OUT INJ PAST YR: ICD-10-PCS | Mod: CPTII,S$GLB,, | Performed by: ANESTHESIOLOGY

## 2023-08-09 PROCEDURE — 1157F PR ADVANCE CARE PLAN OR EQUIV PRESENT IN MEDICAL RECORD: ICD-10-PCS | Mod: CPTII,S$GLB,, | Performed by: ANESTHESIOLOGY

## 2023-08-09 PROCEDURE — 3008F PR BODY MASS INDEX (BMI) DOCUMENTED: ICD-10-PCS | Mod: CPTII,S$GLB,, | Performed by: ANESTHESIOLOGY

## 2023-08-09 PROCEDURE — 99999 PR PBB SHADOW E&M-EST. PATIENT-LVL V: CPT | Mod: PBBFAC,,, | Performed by: ANESTHESIOLOGY

## 2023-08-09 PROCEDURE — 3061F PR NEG MICROALBUMINURIA RESULT DOCUMENTED/REVIEW: ICD-10-PCS | Mod: CPTII,S$GLB,, | Performed by: ANESTHESIOLOGY

## 2023-08-09 PROCEDURE — 72040 X-RAY EXAM NECK SPINE 2-3 VW: CPT | Mod: 26,,, | Performed by: RADIOLOGY

## 2023-08-09 PROCEDURE — 3288F PR FALLS RISK ASSESSMENT DOCUMENTED: ICD-10-PCS | Mod: CPTII,S$GLB,, | Performed by: ANESTHESIOLOGY

## 2023-08-09 PROCEDURE — 3074F SYST BP LT 130 MM HG: CPT | Mod: CPTII,S$GLB,, | Performed by: ANESTHESIOLOGY

## 2023-08-09 PROCEDURE — 99999 PR PBB SHADOW E&M-EST. PATIENT-LVL V: ICD-10-PCS | Mod: PBBFAC,,, | Performed by: ANESTHESIOLOGY

## 2023-08-09 PROCEDURE — 4010F ACE/ARB THERAPY RXD/TAKEN: CPT | Mod: CPTII,S$GLB,, | Performed by: ANESTHESIOLOGY

## 2023-08-09 PROCEDURE — 72070 XR THORACIC SPINE AP LATERAL: ICD-10-PCS | Mod: 26,,, | Performed by: RADIOLOGY

## 2023-08-09 PROCEDURE — 3008F BODY MASS INDEX DOCD: CPT | Mod: CPTII,S$GLB,, | Performed by: ANESTHESIOLOGY

## 2023-08-09 PROCEDURE — 72100 X-RAY EXAM L-S SPINE 2/3 VWS: CPT | Mod: 26,,, | Performed by: RADIOLOGY

## 2023-08-09 PROCEDURE — 72040 X-RAY EXAM NECK SPINE 2-3 VW: CPT | Mod: TC,FY

## 2023-08-09 PROCEDURE — 4010F PR ACE/ARB THEARPY RXD/TAKEN: ICD-10-PCS | Mod: CPTII,S$GLB,, | Performed by: ANESTHESIOLOGY

## 2023-08-09 PROCEDURE — 3066F NEPHROPATHY DOC TX: CPT | Mod: CPTII,S$GLB,, | Performed by: ANESTHESIOLOGY

## 2023-08-09 PROCEDURE — 99204 PR OFFICE/OUTPT VISIT, NEW, LEVL IV, 45-59 MIN: ICD-10-PCS | Mod: S$GLB,,, | Performed by: ANESTHESIOLOGY

## 2023-08-09 PROCEDURE — 1125F AMNT PAIN NOTED PAIN PRSNT: CPT | Mod: CPTII,S$GLB,, | Performed by: ANESTHESIOLOGY

## 2023-08-09 PROCEDURE — 3288F FALL RISK ASSESSMENT DOCD: CPT | Mod: CPTII,S$GLB,, | Performed by: ANESTHESIOLOGY

## 2023-08-09 PROCEDURE — 3061F NEG MICROALBUMINURIA REV: CPT | Mod: CPTII,S$GLB,, | Performed by: ANESTHESIOLOGY

## 2023-08-09 PROCEDURE — 99204 OFFICE O/P NEW MOD 45 MIN: CPT | Mod: S$GLB,,, | Performed by: ANESTHESIOLOGY

## 2023-08-09 PROCEDURE — 1159F MED LIST DOCD IN RCRD: CPT | Mod: CPTII,S$GLB,, | Performed by: ANESTHESIOLOGY

## 2023-08-09 PROCEDURE — 3066F PR DOCUMENTATION OF TREATMENT FOR NEPHROPATHY: ICD-10-PCS | Mod: CPTII,S$GLB,, | Performed by: ANESTHESIOLOGY

## 2023-08-09 PROCEDURE — 1157F ADVNC CARE PLAN IN RCRD: CPT | Mod: CPTII,S$GLB,, | Performed by: ANESTHESIOLOGY

## 2023-08-09 PROCEDURE — 3078F DIAST BP <80 MM HG: CPT | Mod: CPTII,S$GLB,, | Performed by: ANESTHESIOLOGY

## 2023-08-09 PROCEDURE — 72040 XR CERVICAL SPINE AP LATERAL: ICD-10-PCS | Mod: 26,,, | Performed by: RADIOLOGY

## 2023-08-09 PROCEDURE — 72100 XR LUMBAR SPINE AP AND LATERAL: ICD-10-PCS | Mod: 26,,, | Performed by: RADIOLOGY

## 2023-08-09 PROCEDURE — 72070 X-RAY EXAM THORAC SPINE 2VWS: CPT | Mod: 26,,, | Performed by: RADIOLOGY

## 2023-08-09 PROCEDURE — 1125F PR PAIN SEVERITY QUANTIFIED, PAIN PRESENT: ICD-10-PCS | Mod: CPTII,S$GLB,, | Performed by: ANESTHESIOLOGY

## 2023-08-09 PROCEDURE — 72100 X-RAY EXAM L-S SPINE 2/3 VWS: CPT | Mod: TC,FY

## 2023-08-09 PROCEDURE — 3074F PR MOST RECENT SYSTOLIC BLOOD PRESSURE < 130 MM HG: ICD-10-PCS | Mod: CPTII,S$GLB,, | Performed by: ANESTHESIOLOGY

## 2023-08-09 PROCEDURE — 1101F PT FALLS ASSESS-DOCD LE1/YR: CPT | Mod: CPTII,S$GLB,, | Performed by: ANESTHESIOLOGY

## 2023-08-09 PROCEDURE — 3078F PR MOST RECENT DIASTOLIC BLOOD PRESSURE < 80 MM HG: ICD-10-PCS | Mod: CPTII,S$GLB,, | Performed by: ANESTHESIOLOGY

## 2023-08-09 PROCEDURE — 1159F PR MEDICATION LIST DOCUMENTED IN MEDICAL RECORD: ICD-10-PCS | Mod: CPTII,S$GLB,, | Performed by: ANESTHESIOLOGY

## 2023-08-09 NOTE — PROGRESS NOTES
PCP: Amelia Salamanca MD    REFERRING PHYSICIAN: *Jessica Pak    CHIEF COMPLAINT: Neck Pain     Original HISTORY OF PRESENT ILLNESS: Constance Dhillon presents to the clinic for the evaluation of the above pain. The pain started in 2018.     Original Pain Description:  The pain is located in the neck and radiates to the left arm. The pain is described as dull. Symptoms interfere with daily activity, sleeping, and work. The patient feels like symptoms have been worsening. Patient denies night fever/night sweats, urinary incontinence, bowel incontinence, significant weight loss, significant motor weakness, and loss of sensations.    Additional History - Patient has a history of lumbar and cervical spinal cord stimulator placement in 2018 (see notes from this clinic in January 2018). Patient reports improvement in symptoms after SCS placement in 2018 for approximately 1 yr; however, symptoms started worsening afterwards. Today, symptoms are more severe than those prior to the SCS placement; however, he has no new symptoms. Symptoms include headaches, neck pain, lower back back, and left shoulder pain. His worst pain is in the neck. He was scheduled to have the spinal cord stimulator removed in January 2023; however, he canceled his surgery due to conflicts with his surgeon. He presents today for evaluation of removal of spinal cord stimulator.      Original PAIN SCORES:  Best: Pain is 5  Worst: Pain is 8  Current: Pain is 5    INTERVAL HISTORY:     6 weeks of Conservative therapy:  PT: February 2023, few weeks, did not help the pain     Treatments / Medications: (Ice/Heat/NSAIDS/APAP/etc):  Multiple pain medications from 2018 - 2023 including morphine, oxycodone, and THC gummies. Patient is uncertain of further details. Currently, taking aspirin, but no other pain medications.     Interventional Pain Procedures: (Previous injections)  January 2018 - Cervical Spinal Cord Stimulator, 80%  relief for approximately 1  yr   January 2018 - Lumbar Spinal Cord Stimulatory, 50% relief for approximately 1 yr     Past Medical History:   Diagnosis Date    Arthritis     post-traumatic arthritis R ankle; L knee arthritis s/p TKA, R knee arthritis    Benign essential tremor     Encounter for blood transfusion     Hypertension     Jaundice     CHILDHOOD    Refusal of blood transfusions as patient is [employer]     Renal calculi     Shingles     Situs inversus totalis     Sleep apnea     PAST H/O, STATES NO PROBLEMS NOW    SOB (shortness of breath)     Wears glasses     White coat syndrome      Past Surgical History:   Procedure Laterality Date    ANKLE FRACTURE SURGERY Right     FUSION    ANKLE FUSION  09/25/2019    CARPAL TUNNEL RELEASE      GASTROPLASTY  1996    HAND SURGERY Right 1977    Carpal tunnel    JOINT REPLACEMENT Left     TKR bilateral    MYELOGRAPHY N/A 12/30/2022    Procedure: MYELOGRAM;  Surgeon: [facility] Diagnostic Provider;  Location: [facility] OR;  Service: General;  Laterality: N/A;    MYELOGRAPHY N/A 1/30/2023    Procedure: MYELOGRAM;  Surgeon: [facility] Diagnostic Provider;  Location: [facility] OR;  Service: General;  Laterality: N/A;    WRIST SURGERY Right 1972    LACERATION REPAIR     Social History     Socioeconomic History    Marital status:     Years of education: 12   Tobacco Use    Smoking status: Never    Smokeless tobacco: Never   Substance and Sexual Activity    Alcohol use: Yes     Comment: every blue moon    Drug use: No    Sexual activity: Yes     Partners: Female     Comment:    Social History Narrative    Lives with wife in [place]; lived in [facility] for many years. Daughter in [place], recently .      Family History   Problem Relation Age of Onset    Other Mother         brain tumor    Diabetes Father         DM2    Heart disease Brother         CAD, pacemaker; heavy EtOH use    Alcohol abuse Brother        Review of patient's allergies indicates:   Allergen Reactions    Meloxicam Other (See  Comments)     Slurred speech    Metoprolol      Caused slurred speech    Minoxidil Other (See Comments)     Facial numbness       Current Outpatient Medications   Medication Sig    carbidopa-levodopa  mg (SINEMET)  mg per tablet Take 1 tablet by mouth 3 (three) times daily.    cholecalciferol, vitamin D3, 125 mcg (5,000 unit) Tab Take 5,000 Units by mouth once daily.    coenzyme Q10-vitamin E 50-5 mg-unit Cap Take 1 capsule by mouth once daily.    ergocalciferol (ERGOCALCIFEROL) 50,000 unit Cap Take 5,000 Units by mouth every 7 days.    ezetimibe (ZETIA) 10 mg tablet Take 1 tablet (10 mg total) by mouth once daily.    fish oil-omega-3 fatty acids 300-1,000 mg capsule Take 2 g by mouth every morning.     furosemide (LASIX) 20 MG tablet Take 1 tablet (20 mg total) by mouth once daily.    furosemide (LASIX) 40 MG tablet Take 1 tablet (40 mg total) by mouth once daily.    multivitamin Liqd Take 1 tablet by mouth.    spironolactone (ALDACTONE) 25 MG tablet Take 1.5 tablets (37.5 mg total) by mouth 2 (two) times daily.    telmisartan (MICARDIS) 80 MG Tab Take 80 mg by mouth every evening.    pravastatin (PRAVACHOL) 20 MG tablet Take 1 tablet (20 mg total) by mouth once daily. (Patient not taking: Reported on 8/9/2023)    topiramate (TOPAMAX) 50 MG tablet Take 50 mg by mouth.     No current facility-administered medications for this visit.       ROS:  GENERAL: No fever. No chills. No fatigue. Denies weight loss. Denies weight gain.  HEENT: Headaches. Neck Pain. Denies vision change. Denies eye pain. Denies double vision. Denies ear pain.   CV: Denies chest pain.   PULM: Shortness of breath (not increased from baseline).  GI: Denies constipation. No diarrhea. No abdominal pain. Denies nausea. Denies vomiting. No blood in stool.  HEME: Denies bleeding problems.  : Denies urgency. No painful urination. No blood in urine.  MS: Shoulder pain. Back Pain.   SKIN: Denies rash.   NEURO: Denies seizures. Weakness.    PSYCH:  Denies difficulty sleeping. No anxiety. Denies depression. No suicidal thoughts.       VITALS:   Vitals:    08/09/23 1322   BP: 105/67   Pulse: 65   Resp: 18   Temp: 98 °F (36.7 °C)   TempSrc: Oral   SpO2: 100%   Weight: 130 kg (286 lb 9.6 oz)   Height: 6' (1.829 m)   PainSc:   5   PainLoc: Back         PHYSICAL EXAM:   GENERAL: Well appearing, in no acute distress, alert and oriented x3.  PSYCH:  Mood and affect appropriate.  SKIN: Skin color, texture, turgor normal, no rashes or lesions.  HEENT:  Normocephalic, atraumatic. Cranial nerves grossly intact.  NECK: No pain to palpation over the cervical paraspinous muscles. No pain to palpation over facets. No pain with neck flexion, extension, or lateral flexion.   PULM: No evidence of respiratory difficulty, symmetric chest rise.  GI:  Non-distended  BACK: Pain with flexion, extension, and rotation of back. Pain to palpation over the cervical and lumbar spinous processes and facet joints. Surgical scars areas of the in upper thoracic and lumbar spine.    EXTREMITIES: No deformities, edema, or skin discoloration.   MUSCULOSKELETAL: Shoulder, hip, and knee provocative maneuvers are negative. No atrophy is noted.  NEURO: Bilateral upper and lower extremity 5/5 strength is normal and symmetric. Bilateral upper and lower extremity coordination and muscle stretch reflexes are physiologic and symmetric. Plantar response are downgoing. Straight leg raising in the supine position is negative to radicular pain. Subjective decreased sensation throughout left arm.   GAIT: normal.    IMAGING:      CT CERVICAL SPINE WITHOUT CONTRAST - 1/30/23      CLINICAL HISTORY:  Cervicalgia neck pain;     TECHNIQUE:  Axial CT images were obtained through the cervical spine without intravenous contrast.  Intrathecal contrast was administered prior to this exam.  Multiplanar reconstructions evaluated.  Iterative reconstruction technique was used.  CT/Cardiac nuclear examinations in the  past 12 months: 4     COMPARISON:  CT cervical spine 12/30/2023     FINDINGS:  Spinal stimulator leads extend into the spinal canal at the T1-T2 level coursing superiorly within the posterior aspect of the spinal canal with the right sided lead terminating at the right lateral aspect of the spinal canal at the level of C1 and the left-sided lead terminating at midline at the level of C2.  Vertebral bodies demonstrate maintained height with no acute fracture detected.  Hypertrophic change at the atlantoaxial joint.  Degenerative related vertebral body elongation at C4 through C6 with prominent circumferential osteophytes and component of congenital spinal canal narrowing at these levels.     C2-C3: Maintained disc space height.  Streak artifact through the canal related to spinal leads resulting in decreased resolution.  Minimal disc osteophyte posteriorly left of midline resulting in trace indentation on the anterior thecal sac, but no significant cord impingement is suspected at this level within the confines of this exam.  Mild bilateral facet arthropathy and uncovertebral osteophytes without significant foraminal narrowing.     C3-C4: Mild loss of disc space height with mild broad-based disc bulge superimposed upon congenital narrowing of the spinal canal.  Resulting mass effect upon the anterior thecal sac and the spinal cord with approximate AP diameter of the thecal sac measuring in the range of 5 mm at its most narrow portion.  Severe bilateral foraminal narrowing related to facet and uncovertebral osteophytes.     C4-C5: Near complete loss of disc space height with broad-based disc osteophyte superimposed upon congenital narrowing of the spinal canal.  In all, these findings result in severe acquired spinal canal narrowing.  Artifact through the spinal canal at this level related to stimulator lead.  Mass effect upon the thecal sac and spinal cord with narrowest AP diameter of the thecal sac felt to be in  the range of 4 mm.  Severe bilateral foraminal narrowing related to uncovertebral and facet osteophytes.     C5-C6: Moderate loss disc space height with broad-based disc osteophyte superimposed upon congenital narrowing of the spinal canal.  Stimulator lead result in streak artifact at this level.  Approximate narrowest AP diameter of the thecal sac felt to be in the range of 5 mm at this level.  Severe bilateral foraminal narrowing.     C6-C7: Near complete loss of disc space height with broad-based disc osteophyte superimposed upon congenital narrowing of the spinal canal narrowest AP diameter of the canal felt to be in the range of 6 mm.  Severe bilateral foraminal narrowing.     C7-T1: Moderate loss of disc space height with congenital narrowing of the spinal canal.  No significant disc herniation identified.  Approximate AP diameter of the thecal sac measures in the range of 8 mm.  Mild bilateral foraminal narrowing.     Impression:     1. Stimulator leads in the posterior aspect of the cervical spinal canal resulting in streak artifact and decreased resolution throughout the study.  2. Multilevel cervical spondylotic change superimposed upon a component of diffuse congenital cervical spinal canal narrowing resulting in varying degrees of acquired spinal canal and foraminal stenoses with individual levels detailed above.  Greatest narrowing is at C4-C5    ASSESSMENT: 72 y.o. year old male with pain, consistent with:    Encounter Diagnosis   Name Primary?    Malfunction of spinal cord stimulator, initial encounter Yes     DISCUSSION: Mr. Dhillon is a nice earnestine who had 2 cervical and 2 thoracic leads and a right lumbar IPG placed by Dr. Garcias. He is no longer getting relief from his stimulator and comes to us requesting explant. Imaging shows far right migration which may be causing his RUE symptoms.     PLAN:  Reviewed prior notes from January 2018 and cervical CT study from January 2023  Ordered cervical,  thoracic, and lumbar AP and lateral x-rays to understand lead placement and plan for lead removal  Referral placed to anesthesia pre-procedure clinic to evaluate patient's candidacy for anesthesia. (He reports an arrest with a previous surgery)  Counseled patient about the benefits and risks of lead removal. Patient expressed understanding and would like SCS system removed.   Plan for lead removal pending imaging and anesthesia pre-procedure visit completion    I would like to thank *Jessica Pak for the opportunity to assist in the care of this patient. We had a very nice visit and I look forward to continuing their care. Please let me know if I can be of further assistance.     Luis Daniel Mcdermott  08/09/2023

## 2023-08-10 ENCOUNTER — PATIENT MESSAGE (OUTPATIENT)
Dept: PAIN MEDICINE | Facility: CLINIC | Age: 73
End: 2023-08-10
Payer: MEDICARE

## 2023-08-10 ENCOUNTER — TELEPHONE (OUTPATIENT)
Dept: PREADMISSION TESTING | Facility: HOSPITAL | Age: 73
End: 2023-08-10
Payer: MEDICARE

## 2023-08-10 NOTE — TELEPHONE ENCOUNTER
----- Message from Khadijah Lizarraga RN sent at 8/9/2023  2:58 PM CDT -----  Please schedule with POC (Anesthesia staff only)-Need to see if patient can have anesthesia. Had cardiac arrest during last surgery.

## 2023-08-17 ENCOUNTER — TELEPHONE (OUTPATIENT)
Dept: PULMONOLOGY | Facility: CLINIC | Age: 73
End: 2023-08-17
Payer: MEDICARE

## 2023-08-17 DIAGNOSIS — R94.2 ABNORMAL PFTS (PULMONARY FUNCTION TESTS): Primary | ICD-10-CM

## 2023-08-17 DIAGNOSIS — J96.00 ACUTE RESPIRATORY FAILURE, UNSPECIFIED WHETHER WITH HYPOXIA OR HYPERCAPNIA: Primary | ICD-10-CM

## 2023-08-17 PROBLEM — J96.20 ACUTE ON CHRONIC RESPIRATORY FAILURE: Status: RESOLVED | Noted: 2017-10-04 | Resolved: 2023-08-17

## 2023-08-17 PROBLEM — J62.8: Status: RESOLVED | Noted: 2019-03-15 | Resolved: 2023-08-17

## 2023-08-17 NOTE — PROGRESS NOTES
Subjective:      Patient ID: Constance Dhillon is a 72 y.o. male.    Chief Complaint: No chief complaint on file.    Pt is a 71 yo CM pmh chronic pain syndrome, restrictive pattern on PFTs, situs inversus totalis, HFpEF, patent foramen ovale, obesity, BETO on CPAP, paralyzed left hemidiaphragm who presents for pre procedure elevation. Pt to have spinal stimulator removed. Pt has been on oxygen since December of 2018. Has had breathing issues for years before 2018, but cannot specify. Pt states that he had cardiac arrest post induction for Inspire device placement.     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   HENT:  Negative for postnasal drip and congestion.    Respiratory:  Positive for dyspnea on extertion. Negative for cough, sputum production, shortness of breath and wheezing.    Cardiovascular:  Negative for chest pain, palpitations and leg swelling.   Gastrointestinal:  Negative for nausea and vomiting.   Neurological:  Negative for syncope and light-headedness.   Psychiatric/Behavioral:  Negative for confusion and sleep disturbance. The patient is not nervous/anxious.        Objective:     Physical Exam   Constitutional: He is oriented to person, place, and time. He appears well-developed and well-nourished. He is obese.   HENT:   Head: Normocephalic.   Cardiovascular: Normal rate, regular rhythm and normal heart sounds. Exam reveals no gallop and no friction rub.   No murmur heard.  Pulmonary/Chest: Effort normal. No stridor. He has decreased breath sounds (left side). He has no wheezes. He has no rhonchi. He has no rales.   Musculoskeletal:         General: No edema.   Neurological: He is alert and oriented to person, place,  and time. Gait normal.   Skin: No cyanosis. Nails show no clubbing.   Psychiatric: He has a normal mood and affect. His behavior is normal. Judgment and thought content normal.   Vitals reviewed.    Personal Diagnostic Review    CT of chest performed on 11/11/22 PE protocol revealed Left hemidiaphragm elevation with compressive atelectasis, no PE, situs in versus and dextrocardia.    Echocardiogram: 4/11/19  Normal left ventricular systolic function. The estimated ejection fraction is 55%  No wall motion abnormalities.  Normal LV diastolic function.  Normal left atrial pressure.  Concentric left ventricular remodeling.  Normal right ventricular systolic function.  Mild right ventricular enlargement.  The aortic root is mildly dilated.  Mild aortic regurgitation.  Mild mitral regurgitation.  No pulmonary hypertension present.  Normal central venous pressure (3 mm Hg).  Patent foramen ovale possibly present with left to right shunting suggested by color flow Doppler. Consider bubble study if clinically indicated and appropriate.    Pulmonary function tests:   PFT: 8/18/23  FEV1: 1.08L (31.3%)  FVC: 1.36L (28.5%)  FEV1/FVC: 79  DLCO: 13.29 (48.1%)    10/19/18  FEV1: 1.38L  (39 % predicted),   FVC:  1.59L (33 % predicted),   FEV1/FVC:  117,   TLC: 3.96L (54 % predicted),   DLCO: 14.16 (42 % predicted)    No flowsheet data found.     Assessment:     1. Patent foramen ovale    2. Chronic respiratory failure with hypoxia and hypercapnia    3. Elevated hemidiaphragm    4. Restrictive pattern present on pulmonary function testing    5. Preop pulmonary/respiratory exam      Outpatient Encounter Medications as of 8/18/2023   Medication Sig Dispense Refill    carbidopa-levodopa  mg (SINEMET)  mg per tablet Take 1 tablet by mouth 3 (three) times daily. 90 tablet 11    cholecalciferol, vitamin D3, 125 mcg (5,000 unit) Tab Take 5,000 Units by mouth once daily.      coenzyme Q10-vitamin E 50-5 mg-unit Cap Take 1 capsule  by mouth once daily.      ergocalciferol (ERGOCALCIFEROL) 50,000 unit Cap Take 5,000 Units by mouth every 7 days.      ezetimibe (ZETIA) 10 mg tablet Take 1 tablet (10 mg total) by mouth once daily. 90 tablet 3    fish oil-omega-3 fatty acids 300-1,000 mg capsule Take 2 g by mouth every morning.       furosemide (LASIX) 20 MG tablet Take 1 tablet (20 mg total) by mouth once daily. 30 tablet 11    furosemide (LASIX) 40 MG tablet Take 1 tablet (40 mg total) by mouth once daily. 30 tablet 11    multivitamin Liqd Take 1 tablet by mouth.      pravastatin (PRAVACHOL) 20 MG tablet Take 1 tablet (20 mg total) by mouth once daily. (Patient not taking: Reported on 8/9/2023) 90 tablet 3    spironolactone (ALDACTONE) 25 MG tablet Take 1.5 tablets (37.5 mg total) by mouth 2 (two) times daily. 90 tablet 11    telmisartan (MICARDIS) 80 MG Tab Take 80 mg by mouth every evening.      topiramate (TOPAMAX) 50 MG tablet Take 50 mg by mouth.       No facility-administered encounter medications on file as of 8/18/2023.     No orders of the defined types were placed in this encounter.    Plan:     Preop pulmonary/respiratory exam  ARISCAT score: 27 (age, hypoxia on room air)  Intermediate risk  13.3% risk of in-hospital post-op pulmonary complications (composite including respiratory failure, respiratory infection, pleural effusion, atelectasis, pneumothorax, bronchospasm, aspiration pneumonitis)    Cross Postoperative Pneumonia score:   2.2 %  Risk of postoperative pneumonia    Cross postoperative Respiratory failure:  3.0 %  Risk of mechanical ventilation for >48 hrs after surgery, or unplanned intubation ?30 days of surgery    Pt with left hemidiaphragm paralysis resulting in chronic respiratory failure with hypoxia and hypercapnia. Limit sedation and post operative narcotics for pain relief. If NIV or mechanical ventilation, do not over ventilate or patient will become alkalotic and reduce respiratory drive. Early mobilization.  Patient has no obstructive airway disease on PFTs. No evidence of asbestosis or silicosis on CT chest. Would consider Echo with bubble study to evaluate for Right to left shunt in setting of patent foramen ovale and known pulmonary hypertenstion PASP of 44. Shunt likely to be exacerbated by PPV.     Restrictive pattern present on pulmonary function testing  Secondary to left hemidiaphragm paralysis. No evidence of parenchymal or interstitial lung disease on imaging despite asbestos and silica exposures. Should not complicate PPV, however will subject him to increased risk of hyperventilation and respiratory alkalosis.     Chronic hypoxemic respiratory failure  As per above. Lowest desaturation to 88-90% without oxygen with activity. Will have minimal oxygen requirements. Patient likely has intrapulmonary shunt in setting of atelectasis from left hemidiaphragm elevation. Possible intracardiac shunt with PPV in setting of patent foramen ovale and known pulmonary hypertension with PASP of 44 documented in cardiology notes.     Patent foramen ovale  Followed by Dr. Love with cardiology. Would consider Echo with bubble study to evaluate for right to left shunt, which can be exacerbated with PPV while under general anesthesia.     Elevated hemidiaphragm  As per restrictive pattern present on PFTs.     Follow up PRN.     Luiz Kwong MD  [facility]

## 2023-08-17 NOTE — TELEPHONE ENCOUNTER
----- Message from Sierra Adams sent at 8/17/2023  8:55 AM CDT -----  Regarding: Pre Op Appt  Contact: [phone]  SCHEDULING/REQUEST    Appt Type: Pre Op Appt    Date/Time Preference: ASAP    Treating Provider: Any    Caller Name: Tracey/Pre Op @ [facility]    Contact Preference: [phone]    Comments/Notes: Please call before scheduling.

## 2023-08-18 ENCOUNTER — OFFICE VISIT (OUTPATIENT)
Dept: PULMONOLOGY | Facility: CLINIC | Age: 73
End: 2023-08-18
Payer: MEDICARE

## 2023-08-18 ENCOUNTER — HOSPITAL ENCOUNTER (OUTPATIENT)
Dept: RADIOLOGY | Facility: HOSPITAL | Age: 73
Discharge: HOME | End: 2023-08-18
Attending: INTERNAL MEDICINE
Payer: MEDICARE

## 2023-08-18 ENCOUNTER — HOSPITAL ENCOUNTER (OUTPATIENT)
Dept: PULMONOLOGY | Facility: CLINIC | Age: 73
Discharge: HOME | End: 2023-08-18
Payer: MEDICARE

## 2023-08-18 VITALS
OXYGEN SATURATION: 95 % | DIASTOLIC BLOOD PRESSURE: 68 MMHG | BODY MASS INDEX: 39.51 KG/M2 | WEIGHT: 282.19 LBS | SYSTOLIC BLOOD PRESSURE: 104 MMHG | HEART RATE: 56 BPM | HEIGHT: 71 IN

## 2023-08-18 DIAGNOSIS — R94.2 ABNORMAL PFTS (PULMONARY FUNCTION TESTS): ICD-10-CM

## 2023-08-18 DIAGNOSIS — J98.6 ELEVATED HEMIDIAPHRAGM: ICD-10-CM

## 2023-08-18 DIAGNOSIS — Q21.12 PATENT FORAMEN OVALE: ICD-10-CM

## 2023-08-18 DIAGNOSIS — Z01.811 PREOP PULMONARY/RESPIRATORY EXAM: Primary | ICD-10-CM

## 2023-08-18 DIAGNOSIS — J96.12 CHRONIC RESPIRATORY FAILURE WITH HYPOXIA AND HYPERCAPNIA: ICD-10-CM

## 2023-08-18 DIAGNOSIS — J96.00 ACUTE RESPIRATORY FAILURE, UNSPECIFIED WHETHER WITH HYPOXIA OR HYPERCAPNIA: ICD-10-CM

## 2023-08-18 DIAGNOSIS — R94.2 RESTRICTIVE PATTERN PRESENT ON PULMONARY FUNCTION TESTING: ICD-10-CM

## 2023-08-18 DIAGNOSIS — J96.11 CHRONIC RESPIRATORY FAILURE WITH HYPOXIA AND HYPERCAPNIA: ICD-10-CM

## 2023-08-18 LAB
DLCO SINGLE BREATH LLN: 20.7
DLCO SINGLE BREATH PRE REF: 48.1 %
DLCO SINGLE BREATH REF: 27.63
DLCOC SBVA LLN: 2.65
DLCOC SBVA REF: 3.77
DLCOC SINGLE BREATH LLN: 20.7
DLCOC SINGLE BREATH REF: 27.63
DLCOCSBVAULN: 4.89
DLCOCSINGLEBREATHULN: 34.56
DLCOSINGLEBREATHULN: 34.56
DLCOVA LLN: 2.65
DLCOVA PRE REF: 158.1 %
DLCOVA PRE: 5.96 ML/(MIN*MMHG*L) (ref 2.65–4.89)
DLCOVA REF: 3.77
DLCOVAULN: 4.89
FEF 25 75 LLN: 0.99
FEF 25 75 PRE REF: 38.8 %
FEF 25 75 REF: 2.37
FET100 CHG: 8.9 %
FEV05 LLN: 1.57
FEV05 REF: 2.7
FEV1 CHG: -3.3 %
FEV1 FVC LLN: 62
FEV1 FVC PRE REF: 103 %
FEV1 FVC REF: 75
FEV1 LLN: 2.29
FEV1 PRE REF: 34.6 %
FEV1 REF: 3.21
FEV1 VOL CHG: -0.04
FVC CHG: -4.9 %
FVC LLN: 3.16
FVC PRE REF: 33.4 %
FVC REF: 4.29
FVC VOL CHG: -0.07
IVC PRE: 1.31 L (ref 3.16–5.43)
IVC SINGLE BREATH LLN: 3.16
IVC SINGLE BREATH PRE REF: 30.6 %
IVC SINGLE BREATH REF: 4.29
IVCSINGLEBREATHULN: 5.43
PEF LLN: 6
PEF PRE REF: 49.8 %
PEF REF: 8.38
PHYSICIAN COMMENT: ABNORMAL
POST FEF 25 75: 1.02 L/S (ref 0.99–4.34)
POST FET 100: 6.01 SEC
POST FEV1 FVC: 79.01 % (ref 61.65–87.65)
POST FEV1: 1.08 L (ref 2.29–4.07)
POST FEV5: 0.92 L (ref 1.57–3.84)
POST FVC: 1.36 L (ref 3.16–5.43)
POST PEF: 4.4 L/S (ref 6–10.76)
PRE DLCO: 13.29 ML/(MIN*MMHG) (ref 20.7–34.56)
PRE FEF 25 75: 0.92 L/S (ref 0.99–4.34)
PRE FET 100: 5.52 SEC
PRE FEV05 REF: 34.2 %
PRE FEV1 FVC: 77.7 % (ref 61.65–87.65)
PRE FEV1: 1.11 L (ref 2.29–4.07)
PRE FEV5: 0.93 L (ref 1.57–3.84)
PRE FVC: 1.43 L (ref 3.16–5.43)
PRE PEF: 4.17 L/S (ref 6–10.76)
VA PRE: 2.23 L (ref 7.18–7.18)
VA SINGLE BREATH LLN: 7.18
VA SINGLE BREATH PRE REF: 31.1 %
VA SINGLE BREATH REF: 7.18
VASINGLEBREATHULN: 7.18

## 2023-08-18 PROCEDURE — 1157F ADVNC CARE PLAN IN RCRD: CPT | Mod: CPTII,S$GLB,, | Performed by: INTERNAL MEDICINE

## 2023-08-18 PROCEDURE — 3008F BODY MASS INDEX DOCD: CPT | Mod: CPTII,S$GLB,, | Performed by: INTERNAL MEDICINE

## 2023-08-18 PROCEDURE — 3288F PR FALLS RISK ASSESSMENT DOCUMENTED: ICD-10-PCS | Mod: CPTII,S$GLB,, | Performed by: INTERNAL MEDICINE

## 2023-08-18 PROCEDURE — 4010F ACE/ARB THERAPY RXD/TAKEN: CPT | Mod: CPTII,S$GLB,, | Performed by: INTERNAL MEDICINE

## 2023-08-18 PROCEDURE — 1159F PR MEDICATION LIST DOCUMENTED IN MEDICAL RECORD: ICD-10-PCS | Mod: CPTII,S$GLB,, | Performed by: INTERNAL MEDICINE

## 2023-08-18 PROCEDURE — 99999 PR PBB SHADOW E&M-EST. PATIENT-LVL III: CPT | Mod: PBBFAC,,, | Performed by: INTERNAL MEDICINE

## 2023-08-18 PROCEDURE — 3074F SYST BP LT 130 MM HG: CPT | Mod: CPTII,S$GLB,, | Performed by: INTERNAL MEDICINE

## 2023-08-18 PROCEDURE — 4010F PR ACE/ARB THEARPY RXD/TAKEN: ICD-10-PCS | Mod: CPTII,S$GLB,, | Performed by: INTERNAL MEDICINE

## 2023-08-18 PROCEDURE — 3078F DIAST BP <80 MM HG: CPT | Mod: CPTII,S$GLB,, | Performed by: INTERNAL MEDICINE

## 2023-08-18 PROCEDURE — 3078F PR MOST RECENT DIASTOLIC BLOOD PRESSURE < 80 MM HG: ICD-10-PCS | Mod: CPTII,S$GLB,, | Performed by: INTERNAL MEDICINE

## 2023-08-18 PROCEDURE — 71046 X-RAY EXAM CHEST 2 VIEWS: CPT | Mod: TC,FY

## 2023-08-18 PROCEDURE — 1101F PT FALLS ASSESS-DOCD LE1/YR: CPT | Mod: CPTII,S$GLB,, | Performed by: INTERNAL MEDICINE

## 2023-08-18 PROCEDURE — 71046 XR CHEST PA AND LATERAL: ICD-10-PCS | Mod: 26,,, | Performed by: RADIOLOGY

## 2023-08-18 PROCEDURE — 3061F PR NEG MICROALBUMINURIA RESULT DOCUMENTED/REVIEW: ICD-10-PCS | Mod: CPTII,S$GLB,, | Performed by: INTERNAL MEDICINE

## 2023-08-18 PROCEDURE — 94729 PR C02/MEMBANE DIFFUSE CAPACITY: ICD-10-PCS | Mod: S$GLB,,, | Performed by: INTERNAL MEDICINE

## 2023-08-18 PROCEDURE — 3074F PR MOST RECENT SYSTOLIC BLOOD PRESSURE < 130 MM HG: ICD-10-PCS | Mod: CPTII,S$GLB,, | Performed by: INTERNAL MEDICINE

## 2023-08-18 PROCEDURE — 99999 PR PBB SHADOW E&M-EST. PATIENT-LVL III: ICD-10-PCS | Mod: PBBFAC,,, | Performed by: INTERNAL MEDICINE

## 2023-08-18 PROCEDURE — 94060 EVALUATION OF WHEEZING: CPT | Mod: S$GLB,,, | Performed by: INTERNAL MEDICINE

## 2023-08-18 PROCEDURE — 3061F NEG MICROALBUMINURIA REV: CPT | Mod: CPTII,S$GLB,, | Performed by: INTERNAL MEDICINE

## 2023-08-18 PROCEDURE — 71046 X-RAY EXAM CHEST 2 VIEWS: CPT | Mod: 26,,, | Performed by: RADIOLOGY

## 2023-08-18 PROCEDURE — 1101F PR PT FALLS ASSESS DOC 0-1 FALLS W/OUT INJ PAST YR: ICD-10-PCS | Mod: CPTII,S$GLB,, | Performed by: INTERNAL MEDICINE

## 2023-08-18 PROCEDURE — 94060 PR EVAL OF BRONCHOSPASM: ICD-10-PCS | Mod: S$GLB,,, | Performed by: INTERNAL MEDICINE

## 2023-08-18 PROCEDURE — 99204 OFFICE O/P NEW MOD 45 MIN: CPT | Mod: 25,S$GLB,, | Performed by: INTERNAL MEDICINE

## 2023-08-18 PROCEDURE — 3066F PR DOCUMENTATION OF TREATMENT FOR NEPHROPATHY: ICD-10-PCS | Mod: CPTII,S$GLB,, | Performed by: INTERNAL MEDICINE

## 2023-08-18 PROCEDURE — 3008F PR BODY MASS INDEX (BMI) DOCUMENTED: ICD-10-PCS | Mod: CPTII,S$GLB,, | Performed by: INTERNAL MEDICINE

## 2023-08-18 PROCEDURE — 99204 PR OFFICE/OUTPT VISIT, NEW, LEVL IV, 45-59 MIN: ICD-10-PCS | Mod: 25,S$GLB,, | Performed by: INTERNAL MEDICINE

## 2023-08-18 PROCEDURE — 3288F FALL RISK ASSESSMENT DOCD: CPT | Mod: CPTII,S$GLB,, | Performed by: INTERNAL MEDICINE

## 2023-08-18 PROCEDURE — 3066F NEPHROPATHY DOC TX: CPT | Mod: CPTII,S$GLB,, | Performed by: INTERNAL MEDICINE

## 2023-08-18 PROCEDURE — 1157F PR ADVANCE CARE PLAN OR EQUIV PRESENT IN MEDICAL RECORD: ICD-10-PCS | Mod: CPTII,S$GLB,, | Performed by: INTERNAL MEDICINE

## 2023-08-18 PROCEDURE — 94729 DIFFUSING CAPACITY: CPT | Mod: S$GLB,,, | Performed by: INTERNAL MEDICINE

## 2023-08-18 PROCEDURE — 1159F MED LIST DOCD IN RCRD: CPT | Mod: CPTII,S$GLB,, | Performed by: INTERNAL MEDICINE

## 2023-08-18 NOTE — ASSESSMENT & PLAN NOTE
ARISCAT score: 27 (age, hypoxia on room air)  Intermediate risk  13.3% risk of in-hospital post-op pulmonary complications (composite including respiratory failure, respiratory infection, pleural effusion, atelectasis, pneumothorax, bronchospasm, aspiration pneumonitis)    Cross Postoperative Pneumonia score:   2.2 %  Risk of postoperative pneumonia    Cross postoperative Respiratory failure:  3.0 %  Risk of mechanical ventilation for >48 hrs after surgery, or unplanned intubation ?30 days of surgery    Pt with left hemidiaphragm paralysis resulting in chronic respiratory failure with hypoxia and hypercapnia. Limit sedation and post operative narcotics for pain relief. If NIV or mechanical ventilation, do not over ventilate or patient will become alkalotic and reduce respiratory drive. Early mobilization. Patient has no obstructive airway disease on PFTs. No evidence of asbestosis or silicosis on CT chest. Would consider Echo with bubble study to evaluate for Right to left shunt in setting of patent foramen ovale and known pulmonary hypertenstion PASP of 44. Shunt likely to be exacerbated by PPV.

## 2023-08-18 NOTE — ASSESSMENT & PLAN NOTE
Followed by Dr. Love with cardiology. Would consider Echo with bubble study to evaluate for right to left shunt, which can be exacerbated with PPV while under general anesthesia.

## 2023-08-18 NOTE — ASSESSMENT & PLAN NOTE
Secondary to left hemidiaphragm paralysis. No evidence of parenchymal or interstitial lung disease on imaging despite asbestos and silica exposures. Should not complicate PPV, however will subject him to increased risk of hyperventilation and respiratory alkalosis.

## 2023-08-18 NOTE — ASSESSMENT & PLAN NOTE
As per above. Lowest desaturation to 88-90% without oxygen with activity. Will have minimal oxygen requirements. Patient likely has intrapulmonary shunt in setting of atelectasis from left hemidiaphragm elevation. Possible intracardiac shunt with PPV in setting of patent foramen ovale and known pulmonary hypertension with PASP of 44 documented in cardiology notes.

## 2023-08-24 ENCOUNTER — ANESTHESIA EVENT (OUTPATIENT)
Dept: SURGERY | Facility: OTHER | Age: 73
End: 2023-08-24
Payer: MEDICARE

## 2023-08-24 ENCOUNTER — HOSPITAL ENCOUNTER (OUTPATIENT)
Dept: PREADMISSION TESTING | Facility: OTHER | Age: 73
Discharge: HOME | End: 2023-08-24
Attending: ANESTHESIOLOGY | Admitting: ANESTHESIOLOGY
Payer: MEDICARE

## 2023-08-24 VITALS
RESPIRATION RATE: 19 BRPM | OXYGEN SATURATION: 93 % | HEART RATE: 74 BPM | WEIGHT: 282 LBS | SYSTOLIC BLOOD PRESSURE: 130 MMHG | TEMPERATURE: 98 F | DIASTOLIC BLOOD PRESSURE: 75 MMHG | HEIGHT: 71 IN | BODY MASS INDEX: 39.48 KG/M2

## 2023-08-24 DIAGNOSIS — Z01.818 PREOP TESTING: Primary | ICD-10-CM

## 2023-08-24 LAB
ANION GAP SERPL CALC-SCNC: 9 MMOL/L (ref 8–16)
BASOPHILS # BLD AUTO: 0.02 K/UL (ref 0–0.2)
BASOPHILS NFR BLD: 0.2 % (ref 0–1.9)
BUN SERPL-MCNC: 17 MG/DL (ref 8–23)
CALCIUM SERPL-MCNC: 9.6 MG/DL (ref 8.7–10.5)
CHLORIDE SERPL-SCNC: 101 MMOL/L (ref 95–110)
CO2 SERPL-SCNC: 31 MMOL/L (ref 23–29)
CREAT SERPL-MCNC: 1.3 MG/DL (ref 0.5–1.4)
DIFFERENTIAL METHOD: ABNORMAL
EOSINOPHIL # BLD AUTO: 0.2 K/UL (ref 0–0.5)
EOSINOPHIL NFR BLD: 2 % (ref 0–8)
ERYTHROCYTE [DISTWIDTH] IN BLOOD BY AUTOMATED COUNT: 12.4 % (ref 11.5–14.5)
EST. GFR  (NO RACE VARIABLE): 58 ML/MIN/1.73 M^2
GLUCOSE SERPL-MCNC: 106 MG/DL (ref 70–110)
HCT VFR BLD AUTO: 39.8 % (ref 40–54)
HGB BLD-MCNC: 12.6 G/DL (ref 14–18)
IMM GRANULOCYTES # BLD AUTO: 0.03 K/UL (ref 0–0.04)
IMM GRANULOCYTES NFR BLD AUTO: 0.4 % (ref 0–0.5)
LYMPHOCYTES # BLD AUTO: 1.9 K/UL (ref 1–4.8)
LYMPHOCYTES NFR BLD: 23.3 % (ref 18–48)
MCH RBC QN AUTO: 32.4 PG (ref 27–31)
MCHC RBC AUTO-ENTMCNC: 31.7 G/DL (ref 32–36)
MCV RBC AUTO: 102 FL (ref 82–98)
MONOCYTES # BLD AUTO: 0.7 K/UL (ref 0.3–1)
MONOCYTES NFR BLD: 8.1 % (ref 4–15)
NEUTROPHILS # BLD AUTO: 5.4 K/UL (ref 1.8–7.7)
NEUTROPHILS NFR BLD: 66 % (ref 38–73)
NRBC BLD-RTO: 0 /100 WBC
PLATELET # BLD AUTO: 185 K/UL (ref 150–450)
PMV BLD AUTO: 9.3 FL (ref 9.2–12.9)
POTASSIUM SERPL-SCNC: 4.7 MMOL/L (ref 3.5–5.1)
RBC # BLD AUTO: 3.89 M/UL (ref 4.6–6.2)
SODIUM SERPL-SCNC: 141 MMOL/L (ref 136–145)
WBC # BLD AUTO: 8.11 K/UL (ref 3.9–12.7)

## 2023-08-24 PROCEDURE — 36415 COLL VENOUS BLD VENIPUNCTURE: CPT | Performed by: ANESTHESIOLOGY

## 2023-08-24 PROCEDURE — 80048 BASIC METABOLIC PNL TOTAL CA: CPT | Performed by: ANESTHESIOLOGY

## 2023-08-24 PROCEDURE — 85025 COMPLETE CBC W/AUTO DIFF WBC: CPT | Performed by: ANESTHESIOLOGY

## 2023-08-24 NOTE — ANESTHESIA PREPROCEDURE EVALUATION
08/24/2023  Constance Dhillon is a 72 y.o., male.    Anesthesia Evaluation    I have reviewed the Patient Summary Reports.    I have reviewed the Nursing Notes. I have reviewed the NPO Status.   I have reviewed the Medications.     Review of Systems  Anesthesia Hx:  No problems with previous Anesthesia  Denies Family Hx of Anesthesia complications.  Personal Hx of Anesthesia complications  Cardiac Arrest   Social:  Non-Smoker    Hematology/Oncology:  Hematology Normal   Oncology Normal     EENT/Dental:EENT/Dental Normal   Cardiovascular:   Hypertension  Denies CHF. NUNO PFO with L to R shunt  Pulm HTN PASP 40   Pulmonary:   Shortness of breath Sleep Apnea Home O2  APAP  Chronic resp failure with hypoxemia and hypercarbia   Renal/:   Chronic Renal Disease    Hepatic/GI:   Gastric bypass   Musculoskeletal:  Spine Disorders: cervical and lumbar Chronic Pain    Neurological:   Neuromuscular Disease, (tremor)   Chronic Pain Syndrome   Endocrine:  Endocrine Normal  Morbid Obesity / BMI > 40  Dermatological:  Skin Normal    Psych:  Psychiatric Normal           Physical Exam  General:  Morbid Obesity, Cooperative, Alert and Oriented      Airway/Jaw/Neck:  Airway Findings: Mouth Opening: Normal   Tongue: Normal   General Airway Assessment: Adult Mallampati: III  TM Distance: Normal, at least 6 cm   Jaw/Neck Findings:  Neck ROM: Normal ROM       Dental:  Dental Findings: In tact                Anesthesia Plan  Type of Anesthesia, risks & benefits discussed:  Anesthesia Type:  MAC    Patient's Preference:   Plan Factors:          Intra-op Monitoring Plan: Standard ASA Monitors  Intra-op Monitoring Plan Comments:   Post Op Pain Control Plan:   Post Op Pain Control Plan Comments:     Induction:    Beta Blocker:         Informed Consent: Informed consent signed with the Patient and all parties understand the risks and agree  with anesthesia plan.  All questions answered.  Anesthesia consent signed with patient.  ASA Score: 4     Day of Surgery Review of History & Physical:        Anesthesia Plan Notes: Patient has entire body situs inversus.  Everything in body is on the opposite side. Paralyzed L hemidiagrm    Pt states had Cardiac Arrest upon induction for Inspire procedure, was resuscitated twice in OR then transferred to [facility], resuscitate 3 more times en route, and spent 11 days in CCU. Does not know of any other interventions done. Cardiology note describes a respiratory arrest requiring resuscitation and likely resulting in a Type 2 MI (demand ischemia) Rec pulm eval prior to any planned surgery, see below    Pulmonary note:  Preop pulmonary/respiratory exam  ARISCAT score: 27 (age, hypoxia on room air)  Intermediate risk  13.3% risk of in-hospital post-op pulmonary complications (composite including respiratory failure, respiratory infection, pleural effusion, atelectasis, pneumothorax, bronchospasm, aspiration pneumonitis)     [place] Postoperative Pneumonia score:   2.2 %  Risk of postoperative pneumonia     [place] postoperative Respiratory failure:  3.0 %  Risk of mechanical ventilation for >48 hrs after surgery, or unplanned intubation =30 days of surgery     Pt with left hemidiaphragm paralysis resulting in chronic respiratory failure with hypoxia and hypercapnia. Limit sedation and post operative narcotics for pain relief. If NIV or mechanical ventilation, do not over ventilate or patient will become alkalotic and reduce respiratory drive. Early mobilization. Patient has no obstructive airway disease on PFTs. No evidence of asbestosis or silicosis on CT chest. Would consider Echo with bubble study to evaluate for Right to left shunt in setting of patent foramen ovale and known pulmonary hypertenstion PASP of 44. Shunt likely to be exacerbated by PPV.      Restrictive pattern present on pulmonary function  testing  Secondary to left hemidiaphragm paralysis. No evidence of parenchymal or interstitial lung disease on imaging despite asbestos and silica exposures. Should not complicate PPV, however will subject him to increased risk of hyperventilation and respiratory alkalosis.      Chronic hypoxemic respiratory failure  As per above. Lowest desaturation to 88-90% without oxygen with activity. Will have minimal oxygen requirements. Patient likely has intrapulmonary shunt in setting of atelectasis from left hemidiaphragm elevation. Possible intracardiac shunt with PPV in setting of patent foramen ovale and known pulmonary hypertension with PASP of 44 documented in cardiology notes.      Patent foramen ovale  Followed by Dr. Love with cardiology. Would consider Echo with bubble study to evaluate for right to left shunt, which can be exacerbated with PPV while under general anesthesia.      Elevated hemidiaphragm  As per restrictive pattern present on PFTs.     DISCUSSED WITH DR. RUSS, PT IS NOT A CANDIDATE FOR GETA, WILL PROCEED WITH LOCAL/MAC ONLY        Ready For Surgery From Anesthesia Perspective.           Physical Exam  General: Morbid Obesity, Cooperative, Alert and Oriented    Airway:  Mallampati: III   Mouth Opening: Normal  TM Distance: Normal, at least 6 cm  Tongue: Normal  Neck ROM: Normal ROM    Dental:  In tact          Anesthesia Plan  Type of Anesthesia, risks & benefits discussed:    Anesthesia Type: MAC  Intra-op Monitoring Plan: Standard ASA Monitors  Informed Consent: Informed consent signed with the Patient and all parties understand the risks and agree with anesthesia plan.  All questions answered.   ASA Score: 4  Anesthesia Plan Notes: Patient has entire body situs inversus.  Everything in body is on the opposite side. Paralyzed L hemidiagrm    Pt states had Cardiac Arrest upon induction for Inspire procedure, was resuscitated twice in OR then transferred to [facility], resuscitate 3 more  times en route, and spent 11 days in CCU. Does not know of any other interventions done. Cardiology note describes a respiratory arrest requiring resuscitation and likely resulting in a Type 2 MI (demand ischemia) Rec pulm eval prior to any planned surgery, see below    Pulmonary note:  Preop pulmonary/respiratory exam  ARISCAT score: 27 (age, hypoxia on room air)  Intermediate risk  13.3% risk of in-hospital post-op pulmonary complications (composite including respiratory failure, respiratory infection, pleural effusion, atelectasis, pneumothorax, bronchospasm, aspiration pneumonitis)     [place] Postoperative Pneumonia score:   2.2 %  Risk of postoperative pneumonia     Cross postoperative Respiratory failure:  3.0 %  Risk of mechanical ventilation for >48 hrs after surgery, or unplanned intubation =30 days of surgery     Pt with left hemidiaphragm paralysis resulting in chronic respiratory failure with hypoxia and hypercapnia. Limit sedation and post operative narcotics for pain relief. If NIV or mechanical ventilation, do not over ventilate or patient will become alkalotic and reduce respiratory drive. Early mobilization. Patient has no obstructive airway disease on PFTs. No evidence of asbestosis or silicosis on CT chest. Would consider Echo with bubble study to evaluate for Right to left shunt in setting of patent foramen ovale and known pulmonary hypertenstion PASP of 44. Shunt likely to be exacerbated by PPV.      Restrictive pattern present on pulmonary function testing  Secondary to left hemidiaphragm paralysis. No evidence of parenchymal or interstitial lung disease on imaging despite asbestos and silica exposures. Should not complicate PPV, however will subject him to increased risk of hyperventilation and respiratory alkalosis.      Chronic hypoxemic respiratory failure  As per above. Lowest desaturation to 88-90% without oxygen with activity. Will have minimal oxygen requirements. Patient likely has  intrapulmonary shunt in setting of atelectasis from left hemidiaphragm elevation. Possible intracardiac shunt with PPV in setting of patent foramen ovale and known pulmonary hypertension with PASP of 44 documented in cardiology notes.      Patent foramen ovale  Followed by Dr. Love with cardiology. Would consider Echo with bubble study to evaluate for right to left shunt, which can be exacerbated with PPV while under general anesthesia.      Elevated hemidiaphragm  As per restrictive pattern present on PFTs.     DISCUSSED WITH DR. RUSS, PT IS NOT A CANDIDATE FOR GETA, WILL PROCEED WITH LOCAL/MAC ONLY    Ready For Surgery From Anesthesia Perspective.       .

## 2023-08-24 NOTE — DISCHARGE INSTRUCTIONS
Information to Prepare you for your Surgery    PRE-ADMIT TESTING -  [phone]    [address] [facility]          Your surgery has been scheduled at [facility]. We are pleased to have the opportunity to serve you. For Further Information please call [phone].    On the day of surgery please report to the Information Desk on the 1st floor.    CONTACT YOUR PHYSICIAN'S OFFICE THE DAY PRIOR TO YOUR SURGERY TO OBTAIN YOUR ARRIVAL TIME.     The evening before surgery do not eat anything after 9 p.m. ( this includes hard candy, chewing gum and mints).  You may only have GATORADE, POWERADE AND WATER  from 9 p.m. until you leave your home.   DO NOT DRINK ANY LIQUIDS ON THE WAY TO THE HOSPITAL.      Why does your anesthesiologist allow you to drink Gatorade/Powerade before surgery?  Gatorade/Powerade helps to increase your comfort before surgery and to decrease your nausea after surgery. The carbohydrates in Gatorade/Powerade help reduce your body's stress response to surgery.  If you are a diabetic-drink only water prior to surgery.       Patients may have 2 visitors pre and post procedure. Only 2 visitors will be allowed in the Surgical building with the patient. No one under the age of 12 will be allowed into the facility.    SPECIAL MEDICATION INSTRUCTIONS: TAKE medications checked off by the Anesthesiologist on your Medication List.    Angiogram Patients: Take medications as instructed by your physician, including aspirin.     Surgery Patients:    If you take ASPIRIN - Your PHYSICIAN/SURGEON will need to inform you IF/OR when you need to stop taking aspirin prior to your surgery.     The week prior to surgery do not ot take any medications containing IBUPROFEN or NSAIDS ( Advil, Motrin, Goodys, BC, Aleve, Naproxen etc) If you are not sure if you should take a medicine please call your surgeon's office.  Ok to take Tylenol    Do Not Wear any make-up  (especially eye make-up) to surgery. Please remove any false eyelashes or eyelash extensions. If you arrive the day of surgery with makeup/eyelashes on you will be required to remove prior to surgery. (There is a risk of corneal abrasions if eye makeup/eyelash extensions are not removed)      Leave all valuables at home.   Do Not wear any jewelry or watches, including any metal in body piercings. Jewelry must be removed prior to coming to the hospital.  There is a possibility that rings that are unable to be removed may be cut off if they are on the surgical extremity.    Please remove all hair extensions, wigs, clips and any other metal accessories/ ornaments from your hair.  These items may pose a flammable/fire risk in Surgery and must be removed.    Do not shave your surgical area at least 5 days prior to your surgery. The surgical prep will be performed at the hospital according to Infection Control regulations.    Contact Lens must be removed before surgery. Either do not wear the contact lens or bring a case and solution for storage.  Please bring a container for eyeglasses or dentures as required.  Bring any paperwork your physician has provided, such as consent forms,  history and physicals, doctor's orders, etc.   Bring comfortable clothes that are loose fitting to wear upon discharge. Take into consideration the type of surgery being performed.  Maintain your diet as advised per your physician the day prior to surgery.      Adequate rest the night before surgery is advised.   Park in the Parking lot behind the hospital or in the [facility] across the street from the parking lot. Parking is complimentary.  If you will be discharged the same day as your procedure, please arrange for a responsible adult to drive you home or to accompany you if traveling by taxi.   YOU WILL NOT BE PERMITTED TO DRIVE OR TO LEAVE THE HOSPITAL ALONE AFTER SURGERY.   If you are being discharged the same day, it is  strongly recommended that you arrange for someone to remain with you for the first 24 hrs following your surgery.    The Surgeon will speak to your family/visitor after your surgery regarding the outcome of your surgery and post op care.  The Surgeon may speak to you after your surgery, but there is a possibility you may not remember the details.  Please check with your family members regarding the conversation with the Surgeon.    We strongly recommend whoever is bringing you home be present for discharge instructions.  This will ensure a thorough understanding for your post op home care.    ALL CHILDREN MUST ALWAYS BE ACCOMPANIED BY AN ADULT.    Visitors-Refer to current Visitor policy handouts.    Thank you for your cooperation.  The Staff of [facility].            Bathing Instructions with Hibiclens    Shower the evening before and morning of your procedure with Chlorhexidine (Hibiclens)  do not use Chlorhexidine on your face or genitals. Do not get in your eyes.  Wash your face with water and your regular face wash/soap  Use your regular shampoo  Apply Chlorhexidine (Hibiclens) directly on your skin or on a wet washcloth and wash gently. When showering: Move away from the shower stream when applying Chlorhexidine (Hibiclens) to avoid rinsing off too soon.  Rinse thoroughly with warm water  Do not dilute Chlorhexidine (Hibiclens)   Dry off as usual, do not use any deodorant, powder, body lotions, perfume, after shave or cologne.

## 2023-09-11 ENCOUNTER — ANESTHESIA (OUTPATIENT)
Dept: SURGERY | Facility: OTHER | Age: 73
End: 2023-09-11
Payer: MEDICARE

## 2023-09-11 ENCOUNTER — HOSPITAL ENCOUNTER (OUTPATIENT)
Facility: OTHER | Age: 73
Discharge: HOME | End: 2023-09-11
Attending: ANESTHESIOLOGY | Admitting: ANESTHESIOLOGY
Payer: MEDICARE

## 2023-09-11 DIAGNOSIS — T85.192A MALFUNCTION OF SPINAL CORD STIMULATOR, INITIAL ENCOUNTER: Primary | ICD-10-CM

## 2023-09-11 DIAGNOSIS — G89.18 POSTOPERATIVE PAIN: Primary | ICD-10-CM

## 2023-09-11 DIAGNOSIS — Z96.89 SPINAL CORD STIMULATOR STATUS: ICD-10-CM

## 2023-09-11 DIAGNOSIS — G89.29 CHRONIC PAIN: ICD-10-CM

## 2023-09-11 PROCEDURE — 88300 SURGICAL PATH GROSS: CPT | Performed by: STUDENT IN AN ORGANIZED HEALTH CARE EDUCATION/TRAINING PROGRAM

## 2023-09-11 PROCEDURE — 63688 REV/RMV IMP SP NPG/R DTCH CN: CPT | Mod: ,,, | Performed by: ANESTHESIOLOGY

## 2023-09-11 PROCEDURE — 71000016 HC POSTOP RECOV ADDL HR: Performed by: ANESTHESIOLOGY

## 2023-09-11 PROCEDURE — 71000015 HC POSTOP RECOV 1ST HR: Performed by: ANESTHESIOLOGY

## 2023-09-11 PROCEDURE — 88300 SURGICAL PATH GROSS: CPT | Mod: 26,,, | Performed by: STUDENT IN AN ORGANIZED HEALTH CARE EDUCATION/TRAINING PROGRAM

## 2023-09-11 PROCEDURE — 36000706: Performed by: ANESTHESIOLOGY

## 2023-09-11 PROCEDURE — 25000003 PHARM REV CODE 250: Performed by: STUDENT IN AN ORGANIZED HEALTH CARE EDUCATION/TRAINING PROGRAM

## 2023-09-11 PROCEDURE — 37000009 HC ANESTHESIA EA ADD 15 MINS: Performed by: ANESTHESIOLOGY

## 2023-09-11 PROCEDURE — 37000008 HC ANESTHESIA 1ST 15 MINUTES: Performed by: ANESTHESIOLOGY

## 2023-09-11 PROCEDURE — 88300 PR  SURG PATH,GROSS,LEVEL I: ICD-10-PCS | Mod: 26,,, | Performed by: STUDENT IN AN ORGANIZED HEALTH CARE EDUCATION/TRAINING PROGRAM

## 2023-09-11 PROCEDURE — D9220A PRA ANESTHESIA: ICD-10-PCS | Mod: ,,, | Performed by: NURSE ANESTHETIST, CERTIFIED REGISTERED

## 2023-09-11 PROCEDURE — 63661 REMOVE SPINE ELTRD PERQ ARAY: CPT | Mod: 51,,, | Performed by: ANESTHESIOLOGY

## 2023-09-11 PROCEDURE — 25000003 PHARM REV CODE 250: Performed by: NURSE ANESTHETIST, CERTIFIED REGISTERED

## 2023-09-11 PROCEDURE — 63661 PR REMOVE SPINAL NEUROSTIM ELECTRODE PERC ARRAY, INCL FLUORO: ICD-10-PCS | Mod: 51,,, | Performed by: ANESTHESIOLOGY

## 2023-09-11 PROCEDURE — 36000707: Performed by: ANESTHESIOLOGY

## 2023-09-11 PROCEDURE — 63600175 PHARM REV CODE 636 W HCPCS: Performed by: STUDENT IN AN ORGANIZED HEALTH CARE EDUCATION/TRAINING PROGRAM

## 2023-09-11 PROCEDURE — 25000003 PHARM REV CODE 250: Performed by: ANESTHESIOLOGY

## 2023-09-11 PROCEDURE — D9220A PRA ANESTHESIA: Mod: ,,, | Performed by: NURSE ANESTHETIST, CERTIFIED REGISTERED

## 2023-09-11 PROCEDURE — 63600175 PHARM REV CODE 636 W HCPCS: Performed by: NURSE ANESTHETIST, CERTIFIED REGISTERED

## 2023-09-11 PROCEDURE — 63688 PR REVISE/REMOVE SPINAL NEUROSTIM/RECEIVER: ICD-10-PCS | Mod: ,,, | Performed by: ANESTHESIOLOGY

## 2023-09-11 PROCEDURE — 27201423 OPTIME MED/SURG SUP & DEVICES STERILE SUPPLY: Performed by: ANESTHESIOLOGY

## 2023-09-11 PROCEDURE — 63600175 PHARM REV CODE 636 W HCPCS: Performed by: ANESTHESIOLOGY

## 2023-09-11 RX ORDER — LIDOCAINE HYDROCHLORIDE AND EPINEPHRINE 10; 10 MG/ML; UG/ML
INJECTION, SOLUTION INFILTRATION; PERINEURAL
Status: DISCONTINUED | OUTPATIENT
Start: 2023-09-11 | End: 2023-09-11 | Stop reason: HOSPADM

## 2023-09-11 RX ORDER — BUPIVACAINE HYDROCHLORIDE 2.5 MG/ML
INJECTION, SOLUTION EPIDURAL; INFILTRATION; INTRACAUDAL
Status: DISCONTINUED | OUTPATIENT
Start: 2023-09-11 | End: 2023-09-11 | Stop reason: HOSPADM

## 2023-09-11 RX ORDER — SODIUM CHLORIDE 9 MG/ML
INJECTION, SOLUTION INTRAVENOUS CONTINUOUS
Status: DISCONTINUED | OUTPATIENT
Start: 2023-09-11 | End: 2023-09-11 | Stop reason: HOSPADM

## 2023-09-11 RX ORDER — MIDAZOLAM HYDROCHLORIDE 1 MG/ML
INJECTION INTRAMUSCULAR; INTRAVENOUS
Status: DISCONTINUED | OUTPATIENT
Start: 2023-09-11 | End: 2023-09-11

## 2023-09-11 RX ORDER — HYDROCODONE BITARTRATE AND ACETAMINOPHEN 7.5; 325 MG/1; MG/1
1 TABLET ORAL EVERY 12 HOURS PRN
Qty: 10 TABLET | Refills: 0 | Status: SHIPPED | OUTPATIENT
Start: 2023-09-11

## 2023-09-11 RX ORDER — HYDROMORPHONE HYDROCHLORIDE 2 MG/ML
0.4 INJECTION, SOLUTION INTRAMUSCULAR; INTRAVENOUS; SUBCUTANEOUS EVERY 5 MIN PRN
Status: DISCONTINUED | OUTPATIENT
Start: 2023-09-11 | End: 2023-09-11 | Stop reason: HOSPADM

## 2023-09-11 RX ORDER — KETAMINE HCL IN 0.9 % NACL 50 MG/5 ML
SYRINGE (ML) INTRAVENOUS
Status: DISCONTINUED | OUTPATIENT
Start: 2023-09-11 | End: 2023-09-11

## 2023-09-11 RX ORDER — PROCHLORPERAZINE EDISYLATE 5 MG/ML
5 INJECTION INTRAMUSCULAR; INTRAVENOUS EVERY 30 MIN PRN
Status: DISCONTINUED | OUTPATIENT
Start: 2023-09-11 | End: 2023-09-11 | Stop reason: HOSPADM

## 2023-09-11 RX ORDER — OXYCODONE HYDROCHLORIDE 5 MG/1
5 TABLET ORAL
Status: DISCONTINUED | OUTPATIENT
Start: 2023-09-11 | End: 2023-09-11 | Stop reason: HOSPADM

## 2023-09-11 RX ORDER — LIDOCAINE HYDROCHLORIDE 10 MG/ML
INJECTION, SOLUTION EPIDURAL; INFILTRATION; INTRACAUDAL; PERINEURAL
Status: DISCONTINUED | OUTPATIENT
Start: 2023-09-11 | End: 2023-09-11 | Stop reason: HOSPADM

## 2023-09-11 RX ORDER — MEPERIDINE HYDROCHLORIDE 25 MG/ML
12.5 INJECTION INTRAMUSCULAR; INTRAVENOUS; SUBCUTANEOUS ONCE AS NEEDED
Status: DISCONTINUED | OUTPATIENT
Start: 2023-09-11 | End: 2023-09-11 | Stop reason: HOSPADM

## 2023-09-11 RX ORDER — VANCOMYCIN HYDROCHLORIDE 1 G/20ML
INJECTION, POWDER, LYOPHILIZED, FOR SOLUTION INTRAVENOUS
Status: DISCONTINUED | OUTPATIENT
Start: 2023-09-11 | End: 2023-09-11 | Stop reason: HOSPADM

## 2023-09-11 RX ORDER — SODIUM CHLORIDE 0.9 % (FLUSH) 0.9 %
3 SYRINGE (ML) INJECTION
Status: DISCONTINUED | OUTPATIENT
Start: 2023-09-11 | End: 2023-09-11 | Stop reason: HOSPADM

## 2023-09-11 RX ORDER — FENTANYL CITRATE 50 UG/ML
INJECTION, SOLUTION INTRAMUSCULAR; INTRAVENOUS
Status: DISCONTINUED | OUTPATIENT
Start: 2023-09-11 | End: 2023-09-11

## 2023-09-11 RX ORDER — CARVEDILOL 25 MG/1
25 TABLET ORAL 2 TIMES DAILY WITH MEALS
COMMUNITY

## 2023-09-11 RX ADMIN — FENTANYL CITRATE 50 MCG: 50 INJECTION, SOLUTION INTRAMUSCULAR; INTRAVENOUS at 01:09

## 2023-09-11 RX ADMIN — FENTANYL CITRATE 25 MCG: 50 INJECTION, SOLUTION INTRAMUSCULAR; INTRAVENOUS at 02:09

## 2023-09-11 RX ADMIN — Medication 10 MG: at 01:09

## 2023-09-11 RX ADMIN — MIDAZOLAM HYDROCHLORIDE 1 MG: 1 INJECTION, SOLUTION INTRAMUSCULAR; INTRAVENOUS at 01:09

## 2023-09-11 RX ADMIN — CEFAZOLIN 2 G: 2 INJECTION, POWDER, FOR SOLUTION INTRAMUSCULAR; INTRAVENOUS at 01:09

## 2023-09-11 RX ADMIN — Medication 10 MG: at 02:09

## 2023-09-11 RX ADMIN — SODIUM CHLORIDE, SODIUM LACTATE, POTASSIUM CHLORIDE, AND CALCIUM CHLORIDE: .6; .31; .03; .02 INJECTION, SOLUTION INTRAVENOUS at 12:09

## 2023-09-11 NOTE — DISCHARGE SUMMARY
Discharge Note  Short Stay      SUMMARY     Admit Date: 9/11/2023    Attending Physician: Massiel Carlton      Discharge Physician: Massiel Carlton      Discharge Date: 9/11/2023 5:43 PM    Procedure(s) (LRB):  REMOVAL, NEUROSTIMULATOR, SPINAL (N/A)  REMOVAL, NEUROSTIMULATOR, CERVIAL (N/A)    Final Diagnosis: Malfunction of spinal cord stimulator, initial encounter [T85.192A]    Disposition: Home or self care    Patient Instructions:   Discharge Medication List as of 9/11/2023  4:54 PM        CONTINUE these medications which have NOT CHANGED    Details   carvediloL (COREG) 25 MG tablet Take 25 mg by mouth 2 (two) times daily with meals., Historical Med      cholecalciferol, vitamin D3, 125 mcg (5,000 unit) Tab Take 5,000 Units by mouth once daily., Historical Med      coenzyme Q10-vitamin E 50-5 mg-unit Cap Take 1 capsule by mouth once daily., Historical Med      ezetimibe (ZETIA) 10 mg tablet Take 1 tablet (10 mg total) by mouth once daily., Starting Thu 3/16/2023, Until Fri 3/15/2024, Normal      fish oil-omega-3 fatty acids 300-1,000 mg capsule Take 2 g by mouth every morning. , Historical Med      !! furosemide (LASIX) 20 MG tablet Take 1 tablet (20 mg total) by mouth once daily., Starting Thu 4/20/2023, Until Fri 4/19/2024, Normal      !! furosemide (LASIX) 40 MG tablet Take 1 tablet (40 mg total) by mouth once daily., Starting Thu 4/20/2023, Until Fri 4/19/2024, Normal      multivitamin Liqd Take 1 tablet by mouth., Historical Med      pravastatin (PRAVACHOL) 20 MG tablet Take 1 tablet (20 mg total) by mouth once daily., Starting Thu 3/16/2023, Until Fri 3/15/2024, Normal      spironolactone (ALDACTONE) 25 MG tablet Take 1.5 tablets (37.5 mg total) by mouth 2 (two) times daily., Starting Thu 4/20/2023, Until Fri 4/19/2024, Normal      telmisartan (MICARDIS) 80 MG Tab Take 80 mg by mouth every evening., Starting Thu 10/6/2022, Historical Med      ergocalciferol (ERGOCALCIFEROL) 50,000 unit Cap Take 5,000  Units by mouth every 7 days., Historical Med       !! - Potential duplicate medications found. Please discuss with provider.              Discharge Diagnosis: Malfunction of spinal cord stimulator, initial encounter [T85.192A]  Condition on Discharge: Stable with no complications to procedure   Diet on Discharge: Same as before.  Activity: as per instruction sheet.  Discharge to: Home with a responsible adult.  Follow up: 2-4 weeks       Please call my office or pager at [phone] if experienced any weakness or loss of sensation, fever > 101.5, pain uncontrolled with oral medications, persistent nausea/vomiting/or diarrhea, redness or drainage from the incisions, or any other worrisome concerns. If physician on call was not reached or could not communicate with our office for any reason please go to the nearest emergency department      Massiel Carlton  09/11/2023

## 2023-09-11 NOTE — OP NOTE
SPINAL CORD STIMULATOR EXPLANT    Preoperative diagnosis: Spinal Cord Stimulator Malfunction    Postoperative diagnosis: Same    Procedure: 1) Removal of Cervical Octad electrodes X 2          2) Removal of Lumbar Octad electrodes X 2         3) Removal of pulse generator     Surgeon: Zonia Carlton MD    Assistant: Dr. Gavino Nath    EBL: 20mL     Complications: None     Specimens: Hardware sent for gross path    Anesthesia: Local MAC     Description of procedure:     After written consent was obtained the patient was brought into the OR and placed in the prone position with all pressure points padded appropriately. The area overlying the skin of the back was prepped and draped in usual sterile fashion using chlorhexidine with an Ioban dressing over the skin. A time out was performed and there was confirmation of preoperative antibiotics, Ancef 2gm IV.     Fluoroscopy was used to identify the implanted generator and leads, these areas were marked. Each incision was anesthetized at the level of the skin and deeper tissues leading to the prevertebral fascia with 20 mL of a equal mixture of 0.5% bupivacaine with 1:200,000 epinephrine +1% lidocaine through a 25-gauge 1 inch needle at each location. This was followed with the skin incision with a 10 blade scalpel, followed by sharp and blunt dissection to expose the anchor sites using cautery for hemostasis. The anchors were exposed and were cut from their sutures. The electrodes were then easily removed and fully intact.    We then opened the IPG pocket as above. Using both sharp and blunt dissection we were able to control the IPG. The anchor was transected and the IPG was removed from the pocket. The electrodes were released from the IPG and then easily retracted through the subcutaneous tissue and out of their midline incisions. Xray was used to confirm that there were no retained contents.     All three sites were closely inspected and hemostasis was confirmed.  Each was then irrigated with normal saline using 500mL per incision. Vancomycin powder was on the field so it was placed in each incision. Each incisions were then closed with two layers of interrupted 2-0 Vicryl followed by continuous running 4-0 Monocryl. This was followed by bacitracin ointment being placed over the incisions. Dressing was placed over the skin and an abdominal binder was placed around the patient.     The patient was taken to recovery in stable condition. The patient was discharged from the hospital in stable condition.    Massiel Carlton  09/11/2023

## 2023-09-11 NOTE — H&P
"HPI  Patient presenting for Procedure(s) (LRB):  REMOVAL, NEUROSTIMULATOR, SPINAL (N/A)     Patient on Anti-coagulation No    No health changes since previous encounter    Past Medical History:   Diagnosis Date    Arthritis     post-traumatic arthritis R ankle; L knee arthritis s/p TKA, R knee arthritis    Benign essential tremor     Encounter for blood transfusion     Hypertension     Jaundice     CHILDHOOD    Refusal of blood transfusions as patient is Jew     Renal calculi     Shingles     Situs inversus totalis     Sleep apnea     PAST H/O, STATES NO PROBLEMS NOW    Sleep apnea, unspecified     APAP    SOB (shortness of breath)     Wears glasses     White coat syndrome      Past Surgical History:   Procedure Laterality Date    ANKLE FRACTURE SURGERY Right     FUSION    ANKLE FUSION  09/25/2019    CARPAL TUNNEL RELEASE      GASTROPLASTY  1996    HAND SURGERY Right 1977    Carpal tunnel    JOINT REPLACEMENT Left     TKR bilateral    MYELOGRAPHY N/A 12/30/2022    Procedure: MYELOGRAM;  Surgeon: [facility] Diagnostic Provider;  Location: [facility] OR;  Service: General;  Laterality: N/A;    MYELOGRAPHY N/A 1/30/2023    Procedure: MYELOGRAM;  Surgeon: [facility] Diagnostic Provider;  Location: [facility] OR;  Service: General;  Laterality: N/A;    WRIST SURGERY Right 1972    LACERATION REPAIR     Review of patient's allergies indicates:   Allergen Reactions    Meloxicam Other (See Comments)     Slurred speech    Metoprolol      Caused slurred speech    Minoxidil Other (See Comments)     Facial numbness      Current Facility-Administered Medications   Medication    ceFAZolin 2 g in dextrose 5 % in water (D5W) 50 mL IVPB (MB+)       PMHx, PSHx, Allergies, Medications reviewed in epic    ROS negative except pain complaints in HPI    OBJECTIVE:    /79 (BP Location: Left arm, Patient Position: Lying)   Pulse (!) 56   Temp 98.1 °F (36.7 °C) (Oral)   Resp 18   Ht 5' 11" (1.803 m)   Wt 127.9 kg (282 lb)   SpO2 95%   BMI " 39.33 kg/m²     PHYSICAL EXAMINATION:    GENERAL: Well appearing, in no acute distress, alert and oriented x3.  PSYCH:  Mood and affect appropriate.  SKIN: Skin color, texture, turgor normal, no rashes or lesions which will impact the procedure.  CV: RRR with palpation of the radial artery.  PULM: No evidence of respiratory difficulty, symmetric chest rise. Clear to auscultation.  NEURO: Cranial nerves grossly intact.    Plan:    Proceed with procedure as planned Procedure(s) (LRB):  REMOVAL, NEUROSTIMULATOR, SPINAL (N/A)    Saturnino Hernandes MD  09/11/2023

## 2023-09-11 NOTE — ANESTHESIA POSTPROCEDURE EVALUATION
Anesthesia Post Evaluation    Patient: Constance Dhillon    Procedure(s) Performed: Procedure(s) (LRB):  REMOVAL, NEUROSTIMULATOR, SPINAL (N/A)  REMOVAL, NEUROSTIMULATOR, CERVIAL (N/A)    Final Anesthesia Type: MAC      Patient location during evaluation: [facility]  Patient participation: Yes- Able to Participate  Level of consciousness: awake and alert, awake and oriented  Post-procedure vital signs: reviewed and stable  Pain management: adequate  Airway patency: patent    PONV status at discharge: No PONV  Anesthetic complications: no      Cardiovascular status: blood pressure returned to baseline, hemodynamically stable and stable  Respiratory status: spontaneous ventilation, unassisted and room air  Hydration status: euvolemic  Follow-up not needed.          Vitals Value Taken Time   /79 09/11/23 1045   Temp 36.7 °C (98.1 °F) 09/11/23 1045   Pulse 56 09/11/23 1045   Resp 18 09/11/23 1045   SpO2 95 % 09/11/23 1045         No case tracking events are documented in the log.      Pain/Alvaro Score: No data recorded

## 2023-09-11 NOTE — PLAN OF CARE
Constance Dhillon has met all discharge criteria from Phase II. Vital Signs are stable, ambulating  without difficulty. Discharge instructions given, patient verbalized understanding. Discharged from facility via wheelchair in stable condition.

## 2023-09-11 NOTE — DISCHARGE INSTRUCTIONS
INSTRUCTIONS    KEEP  DRESSING ON UNTIL OFFICE VISIT    SPONGE BATH ONLY until seen by Doctor    Activity: Walk around house to perform normal daily activities NO STRENUOUS ACTIVITY FOR 4 WEEKS OR UNTIL DOCTOR RELEASES YOU    NO REPETITIVE ACTIONS ( no bending or twisting at the waist)       FOLLOW ANY OTHER INSTRUCTIONS GIVEN TO YOU BY DR Carlton

## 2023-09-12 ENCOUNTER — TELEPHONE (OUTPATIENT)
Dept: PAIN MEDICINE | Facility: CLINIC | Age: 73
End: 2023-09-12
Payer: MEDICARE

## 2023-09-12 VITALS
BODY MASS INDEX: 39.48 KG/M2 | OXYGEN SATURATION: 92 % | DIASTOLIC BLOOD PRESSURE: 75 MMHG | SYSTOLIC BLOOD PRESSURE: 133 MMHG | HEART RATE: 61 BPM | RESPIRATION RATE: 16 BRPM | WEIGHT: 282 LBS | HEIGHT: 71 IN | TEMPERATURE: 98 F

## 2023-09-12 NOTE — TELEPHONE ENCOUNTER
Staff called patient in regards to a reschedule request. Staff left voice a voice message for patient to return call

## 2023-09-12 NOTE — TELEPHONE ENCOUNTER
----- Message from Vicky Stearns sent at 9/12/2023  8:13 AM CDT -----  Contact: ROBERTH BRADFORD [[id number]]  Type: Call Back      Who called: ROBERTH BRADFORD [[id number]]      What is the request in detail: Patient is requesting a call back in regard to rescheduling the 09/21 post op appointment.   Please advise.     Can the clinic reply by [facility]? Yes      Would the patient rather a call back or a response via [facility]? Call back       Best call back number: [phone]      Additional Information:

## 2023-09-13 ENCOUNTER — TELEPHONE (OUTPATIENT)
Dept: PAIN MEDICINE | Facility: CLINIC | Age: 73
End: 2023-09-13
Payer: MEDICARE

## 2023-09-13 NOTE — TELEPHONE ENCOUNTER
----- Message from Farrah Rios sent at 9/13/2023  7:31 AM CDT -----  Regarding: Appointment Access                  Name of Who is Calling: Constance Dhillon    Who Left The Message:  Constance Dhillon      What is the request in detail:      Patient called requesting to reschedule his upcoming Post - OP appointment; I did attempt to reschedule per request but was unsuccessful.  Please further advise.   Thank you      Reply by [facility]: YES      Preferred Call Back :  [phone] (m)

## 2023-09-13 NOTE — TELEPHONE ENCOUNTER
Staff returned patient call in regards to canceling post op visit. Patient decided to keep appointment. Staff clarified with patient. Appointment confirmed

## 2023-09-14 LAB
FINAL PATHOLOGIC DIAGNOSIS: NORMAL
GROSS: NORMAL
Lab: NORMAL

## 2023-09-21 ENCOUNTER — OFFICE VISIT (OUTPATIENT)
Dept: PAIN MEDICINE | Facility: CLINIC | Age: 73
End: 2023-09-21
Payer: MEDICARE

## 2023-09-21 VITALS
HEART RATE: 57 BPM | WEIGHT: 290.38 LBS | DIASTOLIC BLOOD PRESSURE: 76 MMHG | OXYGEN SATURATION: 100 % | RESPIRATION RATE: 18 BRPM | BODY MASS INDEX: 40.49 KG/M2 | TEMPERATURE: 99 F | SYSTOLIC BLOOD PRESSURE: 128 MMHG

## 2023-09-21 DIAGNOSIS — T85.192D MALFUNCTION OF SPINAL CORD STIMULATOR, SUBSEQUENT ENCOUNTER: Primary | ICD-10-CM

## 2023-09-21 PROCEDURE — 99024 POSTOP FOLLOW-UP VISIT: CPT | Mod: S$GLB,,, | Performed by: ANESTHESIOLOGY

## 2023-09-21 PROCEDURE — 3074F SYST BP LT 130 MM HG: CPT | Mod: CPTII,S$GLB,, | Performed by: ANESTHESIOLOGY

## 2023-09-21 PROCEDURE — 1159F PR MEDICATION LIST DOCUMENTED IN MEDICAL RECORD: ICD-10-PCS | Mod: CPTII,S$GLB,, | Performed by: ANESTHESIOLOGY

## 2023-09-21 PROCEDURE — 3066F PR DOCUMENTATION OF TREATMENT FOR NEPHROPATHY: ICD-10-PCS | Mod: CPTII,S$GLB,, | Performed by: ANESTHESIOLOGY

## 2023-09-21 PROCEDURE — 3078F DIAST BP <80 MM HG: CPT | Mod: CPTII,S$GLB,, | Performed by: ANESTHESIOLOGY

## 2023-09-21 PROCEDURE — 3061F PR NEG MICROALBUMINURIA RESULT DOCUMENTED/REVIEW: ICD-10-PCS | Mod: CPTII,S$GLB,, | Performed by: ANESTHESIOLOGY

## 2023-09-21 PROCEDURE — 4010F ACE/ARB THERAPY RXD/TAKEN: CPT | Mod: CPTII,S$GLB,, | Performed by: ANESTHESIOLOGY

## 2023-09-21 PROCEDURE — 3008F BODY MASS INDEX DOCD: CPT | Mod: CPTII,S$GLB,, | Performed by: ANESTHESIOLOGY

## 2023-09-21 PROCEDURE — 4010F PR ACE/ARB THEARPY RXD/TAKEN: ICD-10-PCS | Mod: CPTII,S$GLB,, | Performed by: ANESTHESIOLOGY

## 2023-09-21 PROCEDURE — 99024 PR POST-OP FOLLOW-UP VISIT: ICD-10-PCS | Mod: S$GLB,,, | Performed by: ANESTHESIOLOGY

## 2023-09-21 PROCEDURE — 3061F NEG MICROALBUMINURIA REV: CPT | Mod: CPTII,S$GLB,, | Performed by: ANESTHESIOLOGY

## 2023-09-21 PROCEDURE — 3074F PR MOST RECENT SYSTOLIC BLOOD PRESSURE < 130 MM HG: ICD-10-PCS | Mod: CPTII,S$GLB,, | Performed by: ANESTHESIOLOGY

## 2023-09-21 PROCEDURE — 3078F PR MOST RECENT DIASTOLIC BLOOD PRESSURE < 80 MM HG: ICD-10-PCS | Mod: CPTII,S$GLB,, | Performed by: ANESTHESIOLOGY

## 2023-09-21 PROCEDURE — 1157F PR ADVANCE CARE PLAN OR EQUIV PRESENT IN MEDICAL RECORD: ICD-10-PCS | Mod: CPTII,S$GLB,, | Performed by: ANESTHESIOLOGY

## 2023-09-21 PROCEDURE — 1125F PR PAIN SEVERITY QUANTIFIED, PAIN PRESENT: ICD-10-PCS | Mod: CPTII,S$GLB,, | Performed by: ANESTHESIOLOGY

## 2023-09-21 PROCEDURE — 1125F AMNT PAIN NOTED PAIN PRSNT: CPT | Mod: CPTII,S$GLB,, | Performed by: ANESTHESIOLOGY

## 2023-09-21 PROCEDURE — 3288F PR FALLS RISK ASSESSMENT DOCUMENTED: ICD-10-PCS | Mod: CPTII,S$GLB,, | Performed by: ANESTHESIOLOGY

## 2023-09-21 PROCEDURE — 1157F ADVNC CARE PLAN IN RCRD: CPT | Mod: CPTII,S$GLB,, | Performed by: ANESTHESIOLOGY

## 2023-09-21 PROCEDURE — 1101F PT FALLS ASSESS-DOCD LE1/YR: CPT | Mod: CPTII,S$GLB,, | Performed by: ANESTHESIOLOGY

## 2023-09-21 PROCEDURE — 99999 PR PBB SHADOW E&M-EST. PATIENT-LVL IV: CPT | Mod: PBBFAC,,, | Performed by: ANESTHESIOLOGY

## 2023-09-21 PROCEDURE — 99999 PR PBB SHADOW E&M-EST. PATIENT-LVL IV: ICD-10-PCS | Mod: PBBFAC,,, | Performed by: ANESTHESIOLOGY

## 2023-09-21 PROCEDURE — 3008F PR BODY MASS INDEX (BMI) DOCUMENTED: ICD-10-PCS | Mod: CPTII,S$GLB,, | Performed by: ANESTHESIOLOGY

## 2023-09-21 PROCEDURE — 3066F NEPHROPATHY DOC TX: CPT | Mod: CPTII,S$GLB,, | Performed by: ANESTHESIOLOGY

## 2023-09-21 PROCEDURE — 3288F FALL RISK ASSESSMENT DOCD: CPT | Mod: CPTII,S$GLB,, | Performed by: ANESTHESIOLOGY

## 2023-09-21 PROCEDURE — 1159F MED LIST DOCD IN RCRD: CPT | Mod: CPTII,S$GLB,, | Performed by: ANESTHESIOLOGY

## 2023-09-21 PROCEDURE — 1101F PR PT FALLS ASSESS DOC 0-1 FALLS W/OUT INJ PAST YR: ICD-10-PCS | Mod: CPTII,S$GLB,, | Performed by: ANESTHESIOLOGY

## 2023-09-21 NOTE — PROGRESS NOTES
PCP: Amelia Salamanca MD    REFERRING PHYSICIAN: No ref. provider found    CHIEF COMPLAINT: Neck Pain       INTERVAL HISTORY:     Interval History 9/21/23  Mr. Dhillon presents today for follow-up after removal of spinal cord stimulator on 9/11/23. Since he reports no pressure in his neck and no radicular symptoms. His pain pre-op in the left arm was 9/10, since removing the leads, he reports being down to a 3/10. He reports improved ROM and no pain since the removal. Denies weakness, numbness/tingling.       Interval History 8/9/23  Original HISTORY OF PRESENT ILLNESS: Constance Dhillon presents to the clinic for the evaluation of the above pain. The pain started in 2018.     Original Pain Description:  The pain is located in the neck and radiates to the left arm. The pain is described as dull. Symptoms interfere with daily activity, sleeping, and work. The patient feels like symptoms have been worsening. Patient denies night fever/night sweats, urinary incontinence, bowel incontinence, significant weight loss, significant motor weakness, and loss of sensations.    Additional History - Patient has a history of lumbar and cervical spinal cord stimulator placement in 2018 (see notes from this clinic in January 2018). Patient reports improvement in symptoms after SCS placement in 2018 for approximately 1 yr; however, symptoms started worsening afterwards. Today, symptoms are more severe than those prior to the SCS placement; however, he has no new symptoms. Symptoms include headaches, neck pain, lower back back, and left shoulder pain. His worst pain is in the neck. He was scheduled to have the spinal cord stimulator removed in January 2023; however, he canceled his surgery due to conflicts with his surgeon. He presents today for evaluation of removal of spinal cord stimulator.      Original PAIN SCORES:  Best: Pain is 5  Worst: Pain is 8  Current: Pain is 5    6 weeks of Conservative therapy:  PT: February 2023, few weeks,  did not help the pain     Treatments / Medications: (Ice/Heat/NSAIDS/APAP/etc):  Multiple pain medications from 2018 - 2023 including morphine, oxycodone, and THC gummies. Patient is uncertain of further details. Currently, taking aspirin, but no other pain medications.     Interventional Pain Procedures: (Previous injections)  January 2018 - Cervical Spinal Cord Stimulator, 80%  relief for approximately 1 yr   January 2018 - Lumbar Spinal Cord Stimulatory, 50% relief for approximately 1 yr     Past Medical History:   Diagnosis Date    Arthritis     post-traumatic arthritis R ankle; L knee arthritis s/p TKA, R knee arthritis    Benign essential tremor     Encounter for blood transfusion     Hypertension     Jaundice     CHILDHOOD    Refusal of blood transfusions as patient is Latter day     Renal calculi     Shingles     Situs inversus totalis     Sleep apnea     PAST H/O, STATES NO PROBLEMS NOW    Sleep apnea, unspecified     APAP    SOB (shortness of breath)     Wears glasses     White coat syndrome      Past Surgical History:   Procedure Laterality Date    ANKLE FRACTURE SURGERY Right     FUSION    ANKLE FUSION  09/25/2019    CARPAL TUNNEL RELEASE      GASTROPLASTY  1996    HAND SURGERY Right 1977    Carpal tunnel    JOINT REPLACEMENT Left     TKR bilateral    MYELOGRAPHY N/A 12/30/2022    Procedure: MYELOGRAM;  Surgeon: Davin Diagnostic Provider;  Location: [facility] OR;  Service: General;  Laterality: N/A;    MYELOGRAPHY N/A 1/30/2023    Procedure: MYELOGRAM;  Surgeon: [facility] Diagnostic Provider;  Location: [facility] OR;  Service: General;  Laterality: N/A;    REMOVAL OF NEUROSTIMULATOR N/A 9/11/2023    Procedure: REMOVAL, NEUROSTIMULATOR, CERVIAL;  Surgeon: Massiel Carlton MD;  Location: [facility] OR;  Service: Pain Management;  Laterality: N/A;    SPINAL CORD STIMULATOR REMOVAL N/A 9/11/2023    Procedure: REMOVAL, NEUROSTIMULATOR, SPINAL;  Surgeon: Massiel Carlton MD;  Location: [facility] OR;  Service: Pain  Management;  Laterality: N/A;    WRIST SURGERY Right 1972    LACERATION REPAIR     Social History     Socioeconomic History    Marital status:     Years of education: 12   Tobacco Use    Smoking status: Never    Smokeless tobacco: Never   Substance and Sexual Activity    Alcohol use: Yes     Comment: every blue moon; NONE 24 HR PRIOR TO SURGERY    Drug use: No    Sexual activity: Yes     Partners: Female     Comment:    Social History Narrative    Lives with wife in [place]; lived in [facility] for many years. Daughter in [place], recently .      Family History   Problem Relation Age of Onset    Other Mother         brain tumor    Diabetes Father         DM2    Heart disease Brother         CAD, pacemaker; heavy EtOH use    Alcohol abuse Brother        Review of patient's allergies indicates:   Allergen Reactions    Meloxicam Other (See Comments)     Slurred speech    Metoprolol      Caused slurred speech    Minoxidil Other (See Comments)     Facial numbness       Current Outpatient Medications   Medication Sig    carvediloL (COREG) 25 MG tablet Take 25 mg by mouth 2 (two) times daily with meals.    cholecalciferol, vitamin D3, 125 mcg (5,000 unit) Tab Take 5,000 Units by mouth once daily.    coenzyme Q10-vitamin E 50-5 mg-unit Cap Take 1 capsule by mouth once daily.    ergocalciferol (ERGOCALCIFEROL) 50,000 unit Cap Take 5,000 Units by mouth every 7 days.    ezetimibe (ZETIA) 10 mg tablet Take 1 tablet (10 mg total) by mouth once daily.    fish oil-omega-3 fatty acids 300-1,000 mg capsule Take 2 g by mouth every morning.     furosemide (LASIX) 20 MG tablet Take 1 tablet (20 mg total) by mouth once daily.    furosemide (LASIX) 40 MG tablet Take 1 tablet (40 mg total) by mouth once daily.    HYDROcodone-acetaminophen (NORCO) 7.5-325 mg per tablet Take 1 tablet by mouth every 12 (twelve) hours as needed for Pain.    multivitamin Liqd Take 1 tablet by mouth.    pravastatin (PRAVACHOL) 20 MG tablet Take  1 tablet (20 mg total) by mouth once daily.    spironolactone (ALDACTONE) 25 MG tablet Take 1.5 tablets (37.5 mg total) by mouth 2 (two) times daily.    telmisartan (MICARDIS) 80 MG Tab Take 80 mg by mouth every evening.     No current facility-administered medications for this visit.       ROS:  GENERAL: No fever. No chills. No fatigue. Denies weight loss. Denies weight gain.  HEENT: Headaches. Neck Pain. Denies vision change. Denies eye pain. Denies double vision. Denies ear pain.   CV: Denies chest pain.   PULM: Shortness of breath (not increased from baseline).  GI: Denies constipation. No diarrhea. No abdominal pain. Denies nausea. Denies vomiting. No blood in stool.  HEME: Denies bleeding problems.  : Denies urgency. No painful urination. No blood in urine.  MS: Shoulder pain. Back Pain.   SKIN: Denies rash.   NEURO: Denies seizures. Weakness.   PSYCH:  Denies difficulty sleeping. No anxiety. Denies depression. No suicidal thoughts.       VITALS:   Vitals:    09/21/23 1503   BP: 128/76   Pulse: (!) 57   Resp: 18   Temp: 98.5 °F (36.9 °C)   SpO2: 100%   Weight: 131.7 kg (290 lb 5.5 oz)   PainSc:   5   PainLoc: Back         PHYSICAL EXAM:   GENERAL: Well appearing, in no acute distress, alert and oriented x3.  PSYCH:  Mood and affect appropriate.  SKIN: Skin color, texture, turgor normal, no rashes or lesions.  HEENT:  Normocephalic, atraumatic. Cranial nerves grossly intact.  NECK: No pain to palpation over the cervical paraspinous muscles. No pain to palpation over facets. No pain with neck flexion, extension, or lateral flexion.   PULM: No evidence of respiratory difficulty, symmetric chest rise.  GI:  Non-distended  BACK: Pain with flexion, extension, and rotation of back. No pain to palpation over the cervical and lumbar spinous processes and facet joints. Surgical scars areas of the in upper thoracic and lumbar spine were un bandaged/inspected and found to be well approximated without any redness, edema,  or signs of infection.   EXTREMITIES: No deformities, edema, or skin discoloration.   MUSCULOSKELETAL: Shoulder, hip, and knee provocative maneuvers are negative. No atrophy is noted.  NEURO: Bilateral upper and lower extremity 5/5 strength is normal and symmetric. Bilateral upper and lower extremity coordination and muscle stretch reflexes are physiologic and symmetric. Plantar response are downgoing. Straight leg raising in the supine position is negative to radicular pain. Sensation normal in bilateral UE  GAIT: normal.    IMAGING:      CT CERVICAL SPINE WITHOUT CONTRAST - 1/30/23      CLINICAL HISTORY:  Cervicalgia neck pain;     TECHNIQUE:  Axial CT images were obtained through the cervical spine without intravenous contrast.  Intrathecal contrast was administered prior to this exam.  Multiplanar reconstructions evaluated.  Iterative reconstruction technique was used.  CT/Cardiac nuclear examinations in the past 12 months: 4     COMPARISON:  CT cervical spine 12/30/2023     FINDINGS:  Spinal stimulator leads extend into the spinal canal at the T1-T2 level coursing superiorly within the posterior aspect of the spinal canal with the right sided lead terminating at the right lateral aspect of the spinal canal at the level of C1 and the left-sided lead terminating at midline at the level of C2.  Vertebral bodies demonstrate maintained height with no acute fracture detected.  Hypertrophic change at the atlantoaxial joint.  Degenerative related vertebral body elongation at C4 through C6 with prominent circumferential osteophytes and component of congenital spinal canal narrowing at these levels.     C2-C3: Maintained disc space height.  Streak artifact through the canal related to spinal leads resulting in decreased resolution.  Minimal disc osteophyte posteriorly left of midline resulting in trace indentation on the anterior thecal sac, but no significant cord impingement is suspected at this level within the  confines of this exam.  Mild bilateral facet arthropathy and uncovertebral osteophytes without significant foraminal narrowing.     C3-C4: Mild loss of disc space height with mild broad-based disc bulge superimposed upon congenital narrowing of the spinal canal.  Resulting mass effect upon the anterior thecal sac and the spinal cord with approximate AP diameter of the thecal sac measuring in the range of 5 mm at its most narrow portion.  Severe bilateral foraminal narrowing related to facet and uncovertebral osteophytes.     C4-C5: Near complete loss of disc space height with broad-based disc osteophyte superimposed upon congenital narrowing of the spinal canal.  In all, these findings result in severe acquired spinal canal narrowing.  Artifact through the spinal canal at this level related to stimulator lead.  Mass effect upon the thecal sac and spinal cord with narrowest AP diameter of the thecal sac felt to be in the range of 4 mm.  Severe bilateral foraminal narrowing related to uncovertebral and facet osteophytes.     C5-C6: Moderate loss disc space height with broad-based disc osteophyte superimposed upon congenital narrowing of the spinal canal.  Stimulator lead result in streak artifact at this level.  Approximate narrowest AP diameter of the thecal sac felt to be in the range of 5 mm at this level.  Severe bilateral foraminal narrowing.     C6-C7: Near complete loss of disc space height with broad-based disc osteophyte superimposed upon congenital narrowing of the spinal canal narrowest AP diameter of the canal felt to be in the range of 6 mm.  Severe bilateral foraminal narrowing.     C7-T1: Moderate loss of disc space height with congenital narrowing of the spinal canal.  No significant disc herniation identified.  Approximate AP diameter of the thecal sac measures in the range of 8 mm.  Mild bilateral foraminal narrowing.     Impression:     1. Stimulator leads in the posterior aspect of the cervical  spinal canal resulting in streak artifact and decreased resolution throughout the study.  2. Multilevel cervical spondylotic change superimposed upon a component of diffuse congenital cervical spinal canal narrowing resulting in varying degrees of acquired spinal canal and foraminal stenoses with individual levels detailed above.  Greatest narrowing is at C4-C5    ASSESSMENT: 72 y.o. year old male with pain, consistent with:    No diagnosis found.    DISCUSSION: Mr. Dhillon is a retired  who had 2 cervical and 2 thoracic leads and a right lumbar IPG placed by Dr. Garcias. Imaging showed far right migration which may have been causing his UE symptoms.  He was no longer getting relief from his stimulator and requested explant which was completed on 9/11/23. Since removal his his pain has decreased from 9/10 to 3/10 and he reports improved ROM and functional mobility.     PLAN:  Incisions were un-bandaged/inspected which were well approximated without redness, edema, or signs of infection. They were cleaned with chlorhexidine and covered with band-aids.  OK to leave uncovered. Ok to shower.   Return to activity as tolerated  Return to clinic as needed    Joaquim Meng D.O., DPT  [employer] [facility], PGY-2

## 2023-11-20 PROBLEM — J96.11 CHRONIC HYPOXEMIC RESPIRATORY FAILURE: Status: RESOLVED | Noted: 2023-08-18 | Resolved: 2023-11-20

## 2024-03-28 PROBLEM — Z71.9 ENCOUNTER FOR HEALTH EDUCATION: Status: ACTIVE | Noted: 2024-03-28

## 2024-03-28 PROBLEM — E87.8 ELECTROLYTE IMBALANCE: Status: ACTIVE | Noted: 2024-03-28

## 2024-03-28 PROBLEM — Z71.89 ENCOUNTER TO DISCUSS TREATMENT OPTIONS: Status: ACTIVE | Noted: 2024-03-28

## 2024-03-28 PROBLEM — Z71.2 ENCOUNTER TO DISCUSS TEST RESULTS: Status: ACTIVE | Noted: 2024-03-28

## 2024-03-28 PROBLEM — D53.9 MACROCYTIC ANEMIA: Status: ACTIVE | Noted: 2024-03-28

## 2024-04-01 PROBLEM — Z53.1 BLOOD TRANSFUSION DECLINED BECAUSE PATIENT IS JEHOVAH'S WITNESS: Status: ACTIVE | Noted: 2024-04-01

## 2024-04-30 PROBLEM — D75.9 CLONAL CYTOPENIA OF UNDETERMINED SIGNIFICANCE (CCUS): Status: ACTIVE | Noted: 2024-04-30

## 2024-04-30 PROBLEM — N17.9 AKI (ACUTE KIDNEY INJURY): Status: ACTIVE | Noted: 2024-04-30

## 2024-09-04 ENCOUNTER — TELEPHONE (OUTPATIENT)
Dept: NEUROLOGY | Facility: CLINIC | Age: 74
End: 2024-09-04

## 2024-09-04 NOTE — TELEPHONE ENCOUNTER
Called and spoke with Mr Dhillon.  Pt has been receiving cancellation appointment reminders due to being on the wait list.  Mr Dhillon asked for the wait list appointment reminder to contact him 2-3 hours in advance. I informed him of the process. Mr Dhillon was ok with this and will keep his Oct appointment but keep him on the wait list.

## 2024-09-04 NOTE — TELEPHONE ENCOUNTER
----- Message from Leigh Leonard sent at 9/4/2024  9:14 AM CDT -----  Regarding: appt access  Contact: pt @ [phone]  Pt is advising that he cannot make it to the appt as he is coming from [place] and is requesting at least two hour notice. Pt is advising that is he can be seen after lunch that would work. Please call to advise further. Thank you for all you are doing.

## 2024-09-30 NOTE — PROGRESS NOTES
Name: Constance Dhillon  MRN: [id number]   CSN: [id number]      Date: 10/01/2024    Referring physician:  Self, Aaareferral  No address on file    Chief Complaint: tremors    History of Present Illness (HPI): Constance Dhillon is a R handed 73 y.o. male with a medical issues significant for ET, cervical disc disease, HTN, HLD, dextrocardia, Situs inversus totalis, BETO, cardiomyopathy who presents for tremors. Here with spouse. Tremors for at least 20 years, both hands -- worse in R hand. Tried primidone in the past. 50 mg up to 4 times daily, failed propranolol. Tried topamax in the past and this was not helpful. Has not tried gabapentin but daughter took it for some reason and has made him reluctant to try. Asks about you trio. Worse with posture and action, cannot carry a glass of water -- worse when he clenches it. No falls, doesn't shuffle his feet.     Rarely drinks etoh.     Family History: 3 sons have tremors     Neuroleptic Exposure: none     2019 with Dr. Lemon  Constance Dhillon is a 69 y.o. male with prior right hand tremor noted with neck and right arm injury (remote) and had responded to neuro-stimulator placement for years. Now with bilateral tremor with activities for the past few years. Tremor is present with action >>> rest on exam and there are no Parkinson's features at this time, otherwise.         Since the last visit, the patient tried primidone for his tremor. He has had benefit but not full benefit of this medication. Still has a bothersome, but its improved.  He is taking Primidone BID and tolerating well. Was helping more at first.   Note the patient is recently status post ankle fusion on crutches  Sees pain management, Dr Marrero for cervical radicular pain, numbness chronically      Note his history of Situs inversus totalis     He is retired from [employer] working.   He is here with his wife of over 50 years. He moved here from [facility] a few years ago to be with family.      Nonmotor/Premotor ROS:  Anosmia: normal   Dysarthria/Hypophonia: wife has told him he talks too low   Dysphagia/Sialorrhea: some sialorrhea   Urinary changes: yes   Constipation: chronic, takes a stool softener and fiber pills.   Falls: none   Freezing: none   Micrographia: shaky and smaller   Sleep issues:  -RBD: none       Review of Systems:   Review of Systems   Constitutional:  Negative for chills, fever and malaise/fatigue.   HENT:  Negative for hearing loss.    Eyes:  Negative for blurred vision and double vision.   Respiratory:  Negative for cough, shortness of breath and stridor.    Cardiovascular:  Negative for chest pain and leg swelling.   Gastrointestinal:  Positive for constipation. Negative for diarrhea and nausea.   Genitourinary:  Positive for frequency. Negative for urgency.   Musculoskeletal:  Negative for falls.   Skin:  Negative for itching and rash.   Neurological:  Positive for tremors. Negative for dizziness, loss of consciousness and weakness.   Psychiatric/Behavioral:  Negative for hallucinations and memory loss.            Past Medical History: The patient  has a past medical history of Arthritis, Benign essential tremor, Encounter for blood transfusion, Hypertension, Jaundice, Refusal of blood transfusions as patient is Hinduism, Renal calculi, Shingles, Situs inversus totalis, Sleep apnea, Sleep apnea, unspecified, SOB (shortness of breath), Wears glasses, and White coat syndrome.    Social History: The patient  reports that he has never smoked. He has never used smokeless tobacco. He reports current alcohol use. He reports that he does not use drugs.    Family History: Their family history includes Alcohol abuse in his brother; Diabetes in his father; Heart disease in his brother; Other in his mother.    Allergies: Meloxicam, Metoprolol, and Minoxidil     Meds:   Current Outpatient Medications on File Prior to Visit   Medication Sig Dispense Refill    metFORMIN  (GLUCOPHAGE-XR) 500 MG ER 24hr tablet Take 500 mg by mouth every morning.      buPROPion (WELLBUTRIN SR) 150 MG TBSR 12 hr tablet Take 150 mg by mouth 2 (two) times daily.      carvediloL (COREG) 25 MG tablet Take 25 mg by mouth 2 (two) times daily with meals.      cholecalciferol, vitamin D3, 125 mcg (5,000 unit) Tab Take 5,000 Units by mouth once daily.      coenzyme Q10-vitamin E 50-5 mg-unit Cap Take 1 capsule by mouth once daily.      ergocalciferol (ERGOCALCIFEROL) 50,000 unit Cap Take 5,000 Units by mouth every 7 days.      ezetimibe (ZETIA) 10 mg tablet Take 1 tablet (10 mg total) by mouth once daily. 90 tablet 3    fish oil-omega-3 fatty acids 300-1,000 mg capsule Take 2 g by mouth every morning.       furosemide (LASIX) 20 MG tablet Take 1 tablet (20 mg total) by mouth once daily. 30 tablet 11    multivitamin Liqd Take 1 tablet by mouth.      OXYGEN-AIR DELIVERY SYSTEMS MISC by [facility].(Non-Drug; Combo Route) route. 3L when having SOB      sildenafiL (VIAGRA) 25 MG tablet Take 1 tablet (25 mg total) by mouth 3 (three) times daily as needed (pulmonary Hypertention). 90 tablet 6    spironolactone (ALDACTONE) 25 MG tablet Take 1.5 tablets (37.5 mg total) by mouth 2 (two) times daily. 90 tablet 11    STIOLTO RESPIMAT 2.5-2.5 mcg/actuation Mist INHALE 2 PUFFS BY MOUTH ONCE DAILY (CONTROLLER)      telmisartan (MICARDIS) 80 MG Tab Take 80 mg by mouth every evening.      [DISCONTINUED] HYDROcodone-acetaminophen (NORCO) 7.5-325 mg per tablet Take 1 tablet by mouth every 12 (twelve) hours as needed for Pain. 10 tablet 0    [DISCONTINUED] LORazepam (ATIVAN) 0.5 MG tablet Take 1 tablet (0.5 mg total) by mouth once. 1 tablet by mouth 30 minutes before bone marrow biopsy for 1 dose 1 tablet 0    [DISCONTINUED] pravastatin (PRAVACHOL) 20 MG tablet Take 1 tablet (20 mg total) by mouth once daily. 90 tablet 3     Current Facility-Administered Medications on File Prior to Visit   Medication Dose Route Frequency Provider Last  Rate Last Admin    LIDOcaine HCL 20 mg/ml (2%) injection 20 mL  20 mL Other 1 time in Clinic/HOD            Exam:  /76 (Patient Position: Sitting)   Pulse 74   Ht 6' (1.829 m)   BMI 35.26 kg/m²     Constitutional  Well-developed, well-nourished, appears stated age   Ophthalmoscopic  No papilledema with no hemorrhages or exudates bilaterally   Cardiovascular  Radial pulses 2+ and symmetric, no LE edema bilaterally   Neurological    * Mental status  MOCA =      - Orientation  Oriented to person, place, time, and situation     - Memory   Intact recent and remote     - Attention/concentration  Attentive, vigilant during exam     - Language  Naming & repetition intact, +2-step commands     - Fund of knowledge  Aware of current events     - Executive  Well-organized thoughts     - Other     * Cranial nerves       - CN II  PERRL, visual fields full to confrontation     - CN III, IV, VI  Extraocular movements full, normal pursuits and saccades     - CN V  Sensation V1 - V3 intact     - CN VII  Face strong and symmetric bilaterally     - CN VIII  Hearing intact bilaterally     - CN IX, X  Palate raises midline and symmetric     - CN XI  SCM and trapezius 5/5 bilaterally     - CN XII  Tongue midline   * Motor  Muscle bulk normal, strength 5/5 throughout   4-/5 L deltoid    * Sensory   Intact to light touch    * Coordination  No dysmetria with finger-to-nose or heel-to-shin   * Gait  See below.   * Deep tendon reflexes  1+ and symmetric throughout UE   Trace patellar    Babinski downgoing bilaterally   * Specialized movement exam  mild hypophonic speech.    No facial masking.   No cogwheel rigidity-- some difficulty relaxing    Mild bradykinesia, does not decrement   Very minimal resting tremor L > R     R > L action > postural tremor    No other dystonia, chorea, athetosis, myoclonus, or tics.   No motor impersistence.   Slightly wide based gait.   No shortened stride length.   No abnormal arm swing.     No postural  instability.      Laboratory/Radiological:  - Results:  No visits with results within 3 Month(s) from this visit.   Latest known visit with results is:   Lab Visit on 05/22/2024   Component Date Value Ref Range Status    WBC 05/22/2024 7.30  3.90 - 12.70 K/uL Final    RBC 05/22/2024 3.67 (L)  4.60 - 6.20 M/uL Final    Hemoglobin 05/22/2024 12.5 (L)  14.0 - 18.0 g/dL Final    Hematocrit 05/22/2024 37.1 (L)  40.0 - 54.0 % Final    MCV 05/22/2024 101 (H)  82 - 98 fL Final    MCH 05/22/2024 34.0 (H)  27.0 - 31.0 pg Final    MCHC 05/22/2024 33.6  32.0 - 36.0 g/dL Final    RDW 05/22/2024 13.4  11.5 - 14.5 % Final    Platelets 05/22/2024 156  150 - 450 K/uL Final    MPV 05/22/2024 7.6  7.4 - 10.4 fL Final    Gran # (ANC) 05/22/2024 4.9  1.8 - 7.7 K/uL Final    Lymph # 05/22/2024 1.7  1.0 - 4.8 K/uL Final    Mono # 05/22/2024 0.6  0.3 - 1.0 K/uL Final    Eos # 05/22/2024 0.2  0.0 - 0.5 K/uL Final    Baso # 05/22/2024 0.00  0.00 - 0.20 K/uL Final    nRBC 05/22/2024 0  0 /100 WBC Final    Gran % 05/22/2024 66.9  38.0 - 73.0 % Final    Lymph % 05/22/2024 22.8  18.0 - 48.0 % Final    Mono % 05/22/2024 7.6  4.0 - 15.0 % Final    Eosinophil % 05/22/2024 2.3  0.0 - 8.0 % Final    Basophil % 05/22/2024 0.4  0.0 - 1.9 % Final    Differential Method 05/22/2024 Automated   Final    Sodium 05/22/2024 141  136 - 145 mmol/L Final    Potassium 05/22/2024 4.1  3.5 - 5.1 mmol/L Final    Chloride 05/22/2024 97  95 - 110 mmol/L Final    CO2 05/22/2024 34 (H)  23 - 29 mmol/L Final    Glucose 05/22/2024 128 (H)  74 - 106 mg/dL Final    BUN 05/22/2024 27 (H)  9 - 20 mg/dL Final    Creatinine 05/22/2024 1.35  0.80 - 1.50 mg/dL Final    Calcium 05/22/2024 9.6  8.4 - 10.2 mg/dL Final    Total Protein 05/22/2024 8.2  6.3 - 8.2 g/dL Final    Albumin 05/22/2024 4.3  3.5 - 5.2 g/dL Final    Total Bilirubin 05/22/2024 0.5  0.2 - 1.3 mg/dL Final    Alkaline Phosphatase 05/22/2024 75  38 - 145 U/L Final    AST 05/22/2024 30  17 - 59 U/L Final    ALT  05/22/2024 9 (L)  10 - 44 U/L Final    Anion Gap 05/22/2024 10  8 - 16 mmol/L Final    eGFR 05/22/2024 55 (A)  >60 mL/min/1.73 m^2 Final    LD 05/22/2024 181  120 - 246 U/L Final       - Independent review of images:      - Independent review of consultant's notes: Hilton Lemon     ASSESSMENT/PLAN:  Tremor  + family history of tremors, 20+ years of tremor  - failed primidone, propranolol, topamax  - not interested in trying gabapentin  - asks about you trio  - discussed ready-steady glove  - discussed FUS-- interested and would like to proceed with referral to [facility]   - mild bradykinesia, monitoring for et conversion to pdism         The patient has a documented history of obesity, HTN, DMII, hyperlipidemia and/or hypercholesteremia with long-term complications such as cerebrovascular disease, peripheral vascular disease, and/or aortic atherosclerosis. Collectively these risk factors may contribute to cerebral atherosclerosis, and cerebral hypoperfusion compounded neurocognitive disorder. Rec'd maximizing cerebrovascular-related medical therapy, including but not limited to cholesterol medications and antiplatelet agents. Rec'd controlling vascular risk factors like hypertension, hyperlipidemia, and Diabetes SBP<130, LDL<100, and A1C<7.0. Rec'd optimizing lifestyle choices, including a heart-healthy diet (e.g., Mediterranean or DASH), increased cardiovascular exercise (goal 150 minutes of moderate-intensity per week), and staying cognitively and socially active.               Follow up: 6 months     This is a patient with a serious and complex neurologic diagnosis whose overall, ongoing care is being managed and monitored by me and our Neurology clinic.   As such, since 2024,  is the appropriate add-on code to accompany the other E/M billing for this visit.      Collaborating Physician, Dr. Manriquez, was available during today's encounter. Any change to plan along with cosign to appear in the EMR.        Total time spent with the patient: 33 minutes.  26 minutes of face-to-face consultation and 7 minutes of chart review and coordination of care, on the day of the visit. This includes face to face time and non-face to face time preparing to see the patient (eg, review of tests), obtaining and/or reviewing separately obtained history, documenting clinical information in the electronic or other health record, independently interpreting resultsand communicating results to the patient/family/caregiver, or care coordination.         Mary Jacobs PA-C   [facility]  Department of Neurology  Movement Disorders

## 2024-10-01 ENCOUNTER — OFFICE VISIT (OUTPATIENT)
Dept: NEUROLOGY | Facility: CLINIC | Age: 74
End: 2024-10-01
Payer: MEDICARE

## 2024-10-01 VITALS
DIASTOLIC BLOOD PRESSURE: 76 MMHG | BODY MASS INDEX: 35.26 KG/M2 | HEART RATE: 74 BPM | SYSTOLIC BLOOD PRESSURE: 134 MMHG | HEIGHT: 72 IN

## 2024-10-01 DIAGNOSIS — E78.5 DYSLIPIDEMIA: ICD-10-CM

## 2024-10-01 DIAGNOSIS — G25.0 ESSENTIAL TREMOR: Primary | ICD-10-CM

## 2024-10-01 DIAGNOSIS — E78.5 HYPERLIPIDEMIA, UNSPECIFIED HYPERLIPIDEMIA TYPE: ICD-10-CM

## 2024-10-01 DIAGNOSIS — E66.01 SEVERE OBESITY (BMI 35.0-39.9) WITH COMORBIDITY: ICD-10-CM

## 2024-10-01 DIAGNOSIS — Z71.89 COUNSELING REGARDING GOALS OF CARE: ICD-10-CM

## 2024-10-01 PROCEDURE — 1101F PT FALLS ASSESS-DOCD LE1/YR: CPT | Mod: CPTII,S$GLB,, | Performed by: PHYSICIAN ASSISTANT

## 2024-10-01 PROCEDURE — 3078F DIAST BP <80 MM HG: CPT | Mod: CPTII,S$GLB,, | Performed by: PHYSICIAN ASSISTANT

## 2024-10-01 PROCEDURE — 99214 OFFICE O/P EST MOD 30 MIN: CPT | Mod: S$GLB,,, | Performed by: PHYSICIAN ASSISTANT

## 2024-10-01 PROCEDURE — 1157F ADVNC CARE PLAN IN RCRD: CPT | Mod: CPTII,S$GLB,, | Performed by: PHYSICIAN ASSISTANT

## 2024-10-01 PROCEDURE — 4010F ACE/ARB THERAPY RXD/TAKEN: CPT | Mod: CPTII,S$GLB,, | Performed by: PHYSICIAN ASSISTANT

## 2024-10-01 PROCEDURE — 3008F BODY MASS INDEX DOCD: CPT | Mod: CPTII,S$GLB,, | Performed by: PHYSICIAN ASSISTANT

## 2024-10-01 PROCEDURE — 1126F AMNT PAIN NOTED NONE PRSNT: CPT | Mod: CPTII,S$GLB,, | Performed by: PHYSICIAN ASSISTANT

## 2024-10-01 PROCEDURE — G2211 COMPLEX E/M VISIT ADD ON: HCPCS | Mod: S$GLB,,, | Performed by: PHYSICIAN ASSISTANT

## 2024-10-01 PROCEDURE — 99999 PR PBB SHADOW E&M-EST. PATIENT-LVL III: CPT | Mod: PBBFAC,,, | Performed by: PHYSICIAN ASSISTANT

## 2024-10-01 PROCEDURE — 3075F SYST BP GE 130 - 139MM HG: CPT | Mod: CPTII,S$GLB,, | Performed by: PHYSICIAN ASSISTANT

## 2024-10-01 PROCEDURE — 3288F FALL RISK ASSESSMENT DOCD: CPT | Mod: CPTII,S$GLB,, | Performed by: PHYSICIAN ASSISTANT

## 2024-10-01 PROCEDURE — 1159F MED LIST DOCD IN RCRD: CPT | Mod: CPTII,S$GLB,, | Performed by: PHYSICIAN ASSISTANT

## 2024-10-01 PROCEDURE — 1160F RVW MEDS BY RX/DR IN RCRD: CPT | Mod: CPTII,S$GLB,, | Performed by: PHYSICIAN ASSISTANT

## 2024-10-01 RX ORDER — METFORMIN HYDROCHLORIDE 500 MG/1
500 TABLET, EXTENDED RELEASE ORAL EVERY MORNING
COMMUNITY
Start: 2024-07-25

## 2024-10-04 ENCOUNTER — TELEPHONE (OUTPATIENT)
Dept: NEUROLOGY | Facility: CLINIC | Age: 74
End: 2024-10-04
Payer: MEDICARE

## 2024-10-04 NOTE — TELEPHONE ENCOUNTER
Received request for imaging from [facility]. Pt could not have MRI of brain due to spinal cord stimulator

## 2024-10-04 NOTE — TELEPHONE ENCOUNTER
----- Message from Mary Jacobs PA-C sent at 10/1/2024  1:03 PM CDT -----  Needs referral to [facility] for focused ultrasound.

## 2024-12-02 NOTE — H&P (VIEW-ONLY)
Chronic Pain - New Consult    Referring Physician: Earl Marrero MD    Chief Complaint: No chief complaint on file.       SUBJECTIVE: Disclaimer: This note has been generated using voice-recognition software. There may be typographical errors that have been missed during proof-reading    Initial encounter:    Constance Dhillon presents to the clinic for the evaluation of Bilateral Shoulder, Bilateral Knee, Bilateral feet and Lower back pain. The pain started over 18 years ago following an work accident and symptoms have been unchanged. States the pain has progressively gotten over time. States he has had pain for many years and has had multiple medications, injections, PT, topical creams, and acupuncture with minimal relief. He is followed by Dr. Earl Marrero who referred him here today to be evaluated for permanent spinal cord stimulator implantation. He most recently had a trial for the lower back and leg pain 3 weeks ago with [facility] and got 50+% reduction in his pain with much improved function. He also had a C-spine SCS trial on 9/18/17 and had 80+% reduction in his pain. He is here today hoping to move forward with the SCS permanent implants. He takes a baby aspirin daily. Denies bowel or bladder incontinence. His biggest pain today is his right foot. He had excellent coverage during the trial and was able to walk up the stairs for the first time in years.     Brief history:    Pain Description:    The pain is located in the Lower back area and radiates to the Bilateral Lower extremity.      At BEST  4/10     At WORST  10/10 on the WORST day.      On average pain is rated as 6/10.     Today the pain is rated as 7/10    The pain is described as aching, numbing, sharp, shooting and tingling      Symptoms interfere with daily activity and sleeping.     Exacerbating factors: Standing, Walking, Lifting and Getting out of bed/chair.      Mitigating factors medications and SCS trial.     Patient denies  Detail Level: Detailed night fever/night sweats, urinary incontinence, bowel incontinence, significant weight loss, significant motor weakness.  Patient denies any suicidal or homicidal ideations    Pain Medications:  Current:  Celebrex  Fentanyl 25 mcg       Tried in Past:  NSAIDs -Never  TCA -Never  SNRI -Never  Anti-convulsants -Never  Muscle Relaxants -all of them   Opioids-Never    Physical Therapy/Home Exercise: yes       report:      Pain Procedures: multiple injections in the past with minimal relief. The most he got with an injection was 3 weeks.     Chiropractor -yes  Acupuncture - yes  TENS unit -never  Spinal decompression -never  Joint replacement -bilateral TKA, right ankle arthrodesis    Imaging:   X-ray of Knee  Right knee.    Total knee replacement    Total right knee arthroplasty with drain in place    Impression total right knee arthroplasty      Electronically signed by: ERIBERTO JUAREZ MD  Date: 09/26/17  Time: 11:35     Past Medical History:   Diagnosis Date    Arthritis     post-traumatic arthritis R ankle; L knee arthritis s/p TKA, R knee arthritis    Benign essential tremor     Encounter for blood transfusion     Hypertension     Jaundice     CHILDHOOD    Renal calculi     Shingles     Situs inversus totalis     Sleep apnea     PAST H/O, STATES NO PROBLEMS NOW    SOB (shortness of breath)     Status post VNS (vagus nerve stimulator) placement     Wears glasses     White coat syndrome      Past Surgical History:   Procedure Laterality Date    ANKLE FRACTURE SURGERY Right     FUSION    CARPAL TUNNEL RELEASE      GASTROPLASTY  1996    HAND SURGERY Right 1977    Carpal tunnel    JOINT REPLACEMENT Left     TKR    WRIST SURGERY Right 1972    LACERATION REPAIR     Social History     Social History    Marital status:      Spouse name: N/A    Number of children: N/A    Years of education: N/A     Occupational History    Not on file.     Social History Main Topics    Smoking status: Former  Smoker     Types: Cigarettes, Cigars, Pipe     Quit date: 1968    Smokeless tobacco: Never Used    Alcohol use No    Drug use: No    Sexual activity: Yes     Partners: Female      Comment:      Other Topics Concern    Not on file     Social History Narrative    Lives with wife in [place]; lived in [place] for many years. Daughter in [place], recently .      Family History   Problem Relation Age of Onset    Other Mother      brain tumor    Diabetes Father      DM2    Heart disease Brother      CAD, pacemaker; heavy EtOH use    Alcohol abuse Brother        Review of patient's allergies indicates:  No Known Allergies    Current Outpatient Prescriptions   Medication Sig    amLODIPine (NORVASC) 10 MG tablet Take 1 tablet (10 mg total) by mouth once daily.    celecoxib (CELEBREX) 400 MG capsule Take 400 mg by mouth every evening.    fentaNYL (DURAGESIC) 25 mcg/hr Place 1 patch onto the skin every 72 hours.    fish oil-omega-3 fatty acids 300-1,000 mg capsule Take 2 g by mouth every morning.     hydroCHLOROthiazide (HYDRODIURIL) 25 MG tablet Take 1 tablet (25 mg total) by mouth once daily.    losartan (COZAAR) 50 MG tablet Take 2 tablets (100 mg total) by mouth once daily. (Patient taking differently: Take 50 mg by mouth every morning. )    minoxidil (LONITEN) 2.5 MG tablet Take 1 tablet (2.5 mg total) by mouth once daily.    MULTIVITAMIN (MULTIPLE VITAMINS DAILY ORAL) Take 1 tablet by mouth every morning.    potassium gluconate 595 mg (99 mg) Tab Take 1 tablet by mouth every morning.     No current facility-administered medications for this visit.        REVIEW OF SYSTEMS:    GENERAL:  No weight loss, malaise or fevers.  HEENT:   No recent changes in vision or hearing  NECK:  Negative for lumps, no difficulty with swallowing.  RESPIRATORY:  Negative for cough, wheezing or shortness of breath, patient denies any recent URI.  CARDIOVASCULAR:  Negative for chest pain, leg swelling or  palpitations.  GI:  Negative for abdominal discomfort, blood in stools or black stools or change in bowel habits.  MUSCULOSKELETAL:  See HPI.  SKIN:  Negative for lesions, rash, and itching.  PSYCH:  No mood disorder or recent psychosocial stressors.  Patients sleep is not disturbed secondary to pain.  HEMATOLOGY/LYMPHOLOGY:  Negative for prolonged bleeding, bruising easily or swollen nodes.  Patient is not currently taking any anti-coagulants  ENDO: No history of diabetes or thyroid dysfunction  NEURO:   No history of headaches, syncope, paralysis, seizures or tremors.  All other reviewed and negative other than HPI.    OBJECTIVE:    /76   Pulse 82   Temp 98 °F (36.7 °C) (Oral)   Resp 18   Ht 6' (1.829 m)   SpO2 99%     PHYSICAL EXAMINATION:    GENERAL: Well appearing, in no acute distress, alert and oriented x3.  PSYCH:  Mood and affect appropriate.  SKIN: Skin color, texture, turgor normal, no rashes or lesions.  HEAD/FACE:  Normocephalic, atraumatic. Cranial nerves grossly intact.  NECK: + palpation over the bilateral cervical paraspinous muscles. Spurling Negative. Mild pain with neck flexion>extension.  CV: RRR with palpation of the radial artery.  PULM: No evidence of respiratory difficulty, symmetric chest rise.  GI:  Soft and non-tender.  BACK: Straight leg raising in the supine position is negative to radicular pain. Mild pain upon palpation over the facet joints of the lumbar spine. No pain over spinous processes. Normal range of motion with pain with lumbar flexion and extension  EXTREMITIES: Peripheral joint ROM is full and pain free without obvious instability or laxity in all four extremities. No deformities, edema, or skin discoloration. Good capillary refill.  MUSCULOSKELETAL: Left shoulder exam with .  There is mild pain with palpation over the sacroiliac joints bilaterally.  FABERs test is negative.  FADIRs test is negative.   Bilateral upper extremity strength is normal and symmetric.  RLE muscle strength limited in the right ankle due to fusion.   No atrophy or tone abnormalities are noted.  NEURO: Absent LE reflexes. Reflexes 1+ and symmetric in the bilateral UE/   Plantar response are downgoing. No clonus.  No loss of sensation is noted.  GAIT: normal.    ASSESSMENT: 67 y.o. year old male with pain, consistent with     Encounter Diagnoses   Name Primary?    Chronic pain of right knee Yes    Chronic pain disorder     Cervical radiculopathy     Neuropathic pain, leg, right        PLAN:     -Will order new xrays of the C,T, and L spine today. We will obtain F/E views of the C and L-spine.   - Will schedule patient for permanent spinal cord implantation with [employer] with two leads in the C-spine and 2 leads in the T-spine. He previously had great benefit with the SCS trials performed by Dr. Earl Marrero. Consent obtained today.   -Continue medication management per Dr. Marrero  -[facility] for post-operative care following the SCS implants    Aydin Bal DO  [facility]      The above plan and management options were discussed at length with patient. Patient is in agreement with the above and verbalized understanding. It will be communicated with the referring physician via electronic record, fax, or mail.    I reviewed and edited the  fellow's note, I conducted my own interview and physical examination and agree with the findings.        Olu Garcias  12/18/2017   Detail Level: Generalized

## (undated) DEVICE — SYR B-D DISP CONTROL 10CC100/C

## (undated) DEVICE — DRAPE C-ARMOR EQUIPMENT COVER

## (undated) DEVICE — PACK DRAPE UNIVERSAL CONVERTOR

## (undated) DEVICE — SUT MCRYL PLUS 4-0 PS2 27IN

## (undated) DEVICE — ELECTRODE REM PLYHSV RETURN 9

## (undated) DEVICE — DRAPE INCISE IOBAN 2 23X17IN

## (undated) DEVICE — WRENCH HEXAGONAL 3

## (undated) DEVICE — SYR LUER SLIP GLASS 5ML

## (undated) DEVICE — APPLICATOR CHLORAPREP ORN 26ML

## (undated) DEVICE — DRESSING TRANS 4X4 TEGADERM

## (undated) DEVICE — DRAPE LAP T SHT W/ INSTR PAD

## (undated) DEVICE — SEE MEDLINE ITEM 153151

## (undated) DEVICE — DRAPE STERI INSTRUMENT 1018

## (undated) DEVICE — SYR IRRIGATION BULB STER 60ML

## (undated) DEVICE — DRESSING 4X8 MEPILEX ADH FOAM

## (undated) DEVICE — NDL HYPO REG 25G X 1 1/2

## (undated) DEVICE — SOL IRR SOD CHL .9% POUR

## (undated) DEVICE — DRAPE SURG W/TWL 17 5/8X23

## (undated) DEVICE — TOOL TUNNELING LONG 35CM

## (undated) DEVICE — DRAPE XRAY EQUIPMENT UNIV

## (undated) DEVICE — SUT D SPECIAL VICRYL 2-0

## (undated) DEVICE — CLOSURE SKIN STERI STRIP 1/2X4

## (undated) DEVICE — SUT VICRYL 2-0 8-18 CP-2

## (undated) DEVICE — ELECTRODE BLD EXT INSUL 1

## (undated) DEVICE — GLOVE BIOGEL SKINSENSE PI 7.5

## (undated) DEVICE — STAPLER SKIN ROTATING HEAD

## (undated) DEVICE — GAUZE SPONGE 4X4 12PLY

## (undated) DEVICE — Device

## (undated) DEVICE — CABLE SPECTRA 2 2X8 OR EXT

## (undated) DEVICE — DEVICE FIXATE SUT BAND DUAL PK

## (undated) DEVICE — KIT REMOTE CONTROL FREELINK

## (undated) DEVICE — UNDERGLOVES BIOGEL PI SIZE 7.5

## (undated) DEVICE — SYR 10CC LUER LOCK

## (undated) DEVICE — SYRINGE 0.9% NACL 10MIL PREFIL

## (undated) DEVICE — SUT MONOCRYL 4-0 PS-2

## (undated) DEVICE — DECANTER VIAL ASEPTIC TRANSFER

## (undated) DEVICE — COVER SNAP 36IN X 30IN

## (undated) DEVICE — SEE MEDLINE ITEM 88971

## (undated) DEVICE — SUT VICRYL PLUS 3-0 SH 18IN

## (undated) DEVICE — DRESSING MEPILEX BORDER 4 X 4